# Patient Record
Sex: MALE | Race: WHITE | NOT HISPANIC OR LATINO
[De-identification: names, ages, dates, MRNs, and addresses within clinical notes are randomized per-mention and may not be internally consistent; named-entity substitution may affect disease eponyms.]

---

## 2017-04-03 ENCOUNTER — NON-APPOINTMENT (OUTPATIENT)
Age: 47
End: 2017-04-03

## 2017-04-03 ENCOUNTER — APPOINTMENT (OUTPATIENT)
Dept: CARDIOLOGY | Facility: CLINIC | Age: 47
End: 2017-04-03

## 2017-04-03 VITALS
TEMPERATURE: 98.3 F | DIASTOLIC BLOOD PRESSURE: 78 MMHG | HEIGHT: 78 IN | BODY MASS INDEX: 29.5 KG/M2 | WEIGHT: 255 LBS | HEART RATE: 63 BPM | OXYGEN SATURATION: 97 % | SYSTOLIC BLOOD PRESSURE: 122 MMHG

## 2017-04-03 DIAGNOSIS — I35.1 NONRHEUMATIC AORTIC (VALVE) INSUFFICIENCY: ICD-10-CM

## 2017-05-01 ENCOUNTER — NON-APPOINTMENT (OUTPATIENT)
Age: 47
End: 2017-05-01

## 2017-08-29 ENCOUNTER — FORM ENCOUNTER (OUTPATIENT)
Age: 47
End: 2017-08-29

## 2017-08-30 ENCOUNTER — OUTPATIENT (OUTPATIENT)
Dept: OUTPATIENT SERVICES | Facility: HOSPITAL | Age: 47
LOS: 1 days | End: 2017-08-30
Payer: COMMERCIAL

## 2017-08-30 ENCOUNTER — APPOINTMENT (OUTPATIENT)
Dept: MRI IMAGING | Facility: HOSPITAL | Age: 47
End: 2017-08-30

## 2017-08-30 DIAGNOSIS — R07.9 CHEST PAIN, UNSPECIFIED: ICD-10-CM

## 2017-08-30 DIAGNOSIS — Q23.1 CONGENITAL INSUFFICIENCY OF AORTIC VALVE: ICD-10-CM

## 2017-08-30 PROCEDURE — 75561 CARDIAC MRI FOR MORPH W/DYE: CPT

## 2017-08-30 PROCEDURE — 75561 CARDIAC MRI FOR MORPH W/DYE: CPT | Mod: 26

## 2017-09-07 ENCOUNTER — CLINICAL ADVICE (OUTPATIENT)
Age: 47
End: 2017-09-07

## 2017-09-14 ENCOUNTER — NON-APPOINTMENT (OUTPATIENT)
Age: 47
End: 2017-09-14

## 2017-09-14 ENCOUNTER — APPOINTMENT (OUTPATIENT)
Dept: CARDIOLOGY | Facility: CLINIC | Age: 47
End: 2017-09-14
Payer: COMMERCIAL

## 2017-09-14 VITALS
HEIGHT: 78 IN | OXYGEN SATURATION: 95 % | BODY MASS INDEX: 29.62 KG/M2 | DIASTOLIC BLOOD PRESSURE: 60 MMHG | WEIGHT: 256 LBS | SYSTOLIC BLOOD PRESSURE: 121 MMHG | TEMPERATURE: 98.1 F | HEART RATE: 67 BPM

## 2017-09-14 PROCEDURE — 99215 OFFICE O/P EST HI 40 MIN: CPT

## 2017-10-03 ENCOUNTER — APPOINTMENT (OUTPATIENT)
Dept: CV DIAGNOSITCS | Facility: HOSPITAL | Age: 47
End: 2017-10-03

## 2017-10-03 ENCOUNTER — OUTPATIENT (OUTPATIENT)
Dept: OUTPATIENT SERVICES | Facility: HOSPITAL | Age: 47
LOS: 1 days | End: 2017-10-03
Payer: COMMERCIAL

## 2017-10-03 DIAGNOSIS — I35.1 NONRHEUMATIC AORTIC (VALVE) INSUFFICIENCY: ICD-10-CM

## 2017-10-03 PROCEDURE — 93306 TTE W/DOPPLER COMPLETE: CPT | Mod: 26

## 2017-10-03 PROCEDURE — 93306 TTE W/DOPPLER COMPLETE: CPT

## 2017-10-03 PROCEDURE — 93312 ECHO TRANSESOPHAGEAL: CPT | Mod: 26

## 2017-10-03 PROCEDURE — 93312 ECHO TRANSESOPHAGEAL: CPT

## 2017-10-06 ENCOUNTER — APPOINTMENT (OUTPATIENT)
Dept: CARDIOLOGY | Facility: CLINIC | Age: 47
End: 2017-10-06
Payer: COMMERCIAL

## 2017-10-06 PROCEDURE — 93015 CV STRESS TEST SUPVJ I&R: CPT

## 2017-12-07 ENCOUNTER — TRANSCRIPTION ENCOUNTER (OUTPATIENT)
Age: 47
End: 2017-12-07

## 2017-12-14 ENCOUNTER — NON-APPOINTMENT (OUTPATIENT)
Age: 47
End: 2017-12-14

## 2017-12-14 ENCOUNTER — APPOINTMENT (OUTPATIENT)
Dept: CARDIOLOGY | Facility: CLINIC | Age: 47
End: 2017-12-14
Payer: COMMERCIAL

## 2017-12-14 VITALS
WEIGHT: 256 LBS | HEART RATE: 77 BPM | OXYGEN SATURATION: 96 % | DIASTOLIC BLOOD PRESSURE: 90 MMHG | SYSTOLIC BLOOD PRESSURE: 150 MMHG | BODY MASS INDEX: 29.58 KG/M2

## 2017-12-14 PROCEDURE — 93000 ELECTROCARDIOGRAM COMPLETE: CPT

## 2017-12-14 PROCEDURE — 99215 OFFICE O/P EST HI 40 MIN: CPT

## 2018-03-27 ENCOUNTER — NON-APPOINTMENT (OUTPATIENT)
Age: 48
End: 2018-03-27

## 2018-03-27 ENCOUNTER — APPOINTMENT (OUTPATIENT)
Dept: CARDIOLOGY | Facility: CLINIC | Age: 48
End: 2018-03-27
Payer: COMMERCIAL

## 2018-03-27 VITALS
HEART RATE: 59 BPM | TEMPERATURE: 98.7 F | SYSTOLIC BLOOD PRESSURE: 140 MMHG | DIASTOLIC BLOOD PRESSURE: 76 MMHG | WEIGHT: 256 LBS | OXYGEN SATURATION: 97 % | BODY MASS INDEX: 29.62 KG/M2 | HEIGHT: 78 IN

## 2018-03-27 PROCEDURE — 93000 ELECTROCARDIOGRAM COMPLETE: CPT

## 2018-03-27 PROCEDURE — 99214 OFFICE O/P EST MOD 30 MIN: CPT

## 2018-04-11 ENCOUNTER — APPOINTMENT (OUTPATIENT)
Dept: CARDIOLOGY | Facility: CLINIC | Age: 48
End: 2018-04-11
Payer: COMMERCIAL

## 2018-04-11 PROCEDURE — 93306 TTE W/DOPPLER COMPLETE: CPT

## 2018-05-14 ENCOUNTER — MEDICATION RENEWAL (OUTPATIENT)
Age: 48
End: 2018-05-14

## 2018-10-09 ENCOUNTER — APPOINTMENT (OUTPATIENT)
Dept: CARDIOLOGY | Facility: CLINIC | Age: 48
End: 2018-10-09
Payer: COMMERCIAL

## 2018-10-09 ENCOUNTER — NON-APPOINTMENT (OUTPATIENT)
Age: 48
End: 2018-10-09

## 2018-10-09 VITALS
WEIGHT: 260 LBS | BODY MASS INDEX: 30.08 KG/M2 | SYSTOLIC BLOOD PRESSURE: 120 MMHG | TEMPERATURE: 98.1 F | OXYGEN SATURATION: 95 % | HEART RATE: 66 BPM | DIASTOLIC BLOOD PRESSURE: 76 MMHG | HEIGHT: 78 IN

## 2018-10-09 PROCEDURE — 93000 ELECTROCARDIOGRAM COMPLETE: CPT

## 2018-10-09 PROCEDURE — 99214 OFFICE O/P EST MOD 30 MIN: CPT

## 2018-11-01 ENCOUNTER — TRANSCRIPTION ENCOUNTER (OUTPATIENT)
Age: 48
End: 2018-11-01

## 2018-11-08 ENCOUNTER — APPOINTMENT (OUTPATIENT)
Dept: CARDIOLOGY | Facility: CLINIC | Age: 48
End: 2018-11-08
Payer: COMMERCIAL

## 2018-11-08 PROCEDURE — 93351 STRESS TTE COMPLETE: CPT

## 2018-11-08 PROCEDURE — 93320 DOPPLER ECHO COMPLETE: CPT

## 2018-11-08 PROCEDURE — 93325 DOPPLER ECHO COLOR FLOW MAPG: CPT

## 2018-11-25 ENCOUNTER — TRANSCRIPTION ENCOUNTER (OUTPATIENT)
Age: 48
End: 2018-11-25

## 2019-03-04 ENCOUNTER — RX RENEWAL (OUTPATIENT)
Age: 49
End: 2019-03-04

## 2019-05-08 ENCOUNTER — NON-APPOINTMENT (OUTPATIENT)
Age: 49
End: 2019-05-08

## 2019-05-08 ENCOUNTER — APPOINTMENT (OUTPATIENT)
Dept: CARDIOLOGY | Facility: CLINIC | Age: 49
End: 2019-05-08
Payer: COMMERCIAL

## 2019-05-08 VITALS
WEIGHT: 264 LBS | DIASTOLIC BLOOD PRESSURE: 78 MMHG | HEART RATE: 65 BPM | BODY MASS INDEX: 30.55 KG/M2 | HEIGHT: 78 IN | OXYGEN SATURATION: 95 % | SYSTOLIC BLOOD PRESSURE: 120 MMHG

## 2019-05-08 PROCEDURE — 93000 ELECTROCARDIOGRAM COMPLETE: CPT

## 2019-05-08 PROCEDURE — 99214 OFFICE O/P EST MOD 30 MIN: CPT

## 2019-05-08 NOTE — HISTORY OF PRESENT ILLNESS
[FreeTextEntry1] : He continue s to feel well overall and he has been Doubles playing tennis without difficulty.\par His exercise capacity appears unchanged on his most recent exercise test in 2018\par He denies any palpitations lightheadedness dizziness or syncope.\par No exertional dyspnea.\par No changes to his medications have been made.\par

## 2019-05-08 NOTE — PHYSICAL EXAM
[General Appearance - Well Developed] : well developed [General Appearance - Well Nourished] : well nourished [General Appearance - In No Acute Distress] : no acute distress [Normal Conjunctiva] : the conjunctiva exhibited no abnormalities [Normal Oropharynx] : normal oropharynx [Respiration, Rhythm And Depth] : normal respiratory rhythm and effort [Normal Jugular Venous V Waves Present] : normal jugular venous V waves present [Heart Sounds] : normal S1 and S2 [Arterial Pulses Normal] : the arterial pulses were normal [Edema] : no peripheral edema present [Regular] : the rhythm was regular [FreeTextEntry1] : a 1/4 holodiastolic murmur is heard at the right Lower sternal border and aex. [Bowel Sounds] : normal bowel sounds [Abdomen Soft] : soft [Abnormal Walk] : normal gait [Cyanosis, Localized] : no localized cyanosis [Skin Color & Pigmentation] : normal skin color and pigmentation [Skin Turgor] : normal skin turgor [Oriented To Time, Place, And Person] : oriented to person, place, and time [Impaired Insight] : insight and judgment were intact [Affect] : the affect was normal

## 2019-05-08 NOTE — DISCUSSION/SUMMARY
[FreeTextEntry1] : Plan for repeat echocardiogram to monitor his left ventricular size for significant dilatation it would trigger surgical evaluation.Mr. Witt is a 48-year-old with bicuspid aortic valve and severe aortic insufficiency on echo a year a-ago.\par He is asymptomatic, but severe, aortic insufficiency.\par No significant aortopathy is seen on MRI.\par I reviewed some of the indications for aortic valve replacement again as well as repair at [facility].\par His blood pressure is improved on his Current dose of atenolol, b.i.d.\par Echo in Nov =/- MRI next time.\par

## 2019-11-12 ENCOUNTER — APPOINTMENT (OUTPATIENT)
Dept: CARDIOLOGY | Facility: CLINIC | Age: 49
End: 2019-11-12
Payer: COMMERCIAL

## 2019-11-12 PROCEDURE — 93306 TTE W/DOPPLER COMPLETE: CPT

## 2019-11-27 ENCOUNTER — RX RENEWAL (OUTPATIENT)
Age: 49
End: 2019-11-27

## 2020-02-27 ENCOUNTER — RX RENEWAL (OUTPATIENT)
Age: 50
End: 2020-02-27

## 2020-05-05 ENCOUNTER — APPOINTMENT (OUTPATIENT)
Dept: CARDIOLOGY | Facility: CLINIC | Age: 50
End: 2020-05-05
Payer: COMMERCIAL

## 2020-05-05 ENCOUNTER — NON-APPOINTMENT (OUTPATIENT)
Age: 50
End: 2020-05-05

## 2020-05-05 VITALS
HEIGHT: 78 IN | WEIGHT: 262 LBS | HEART RATE: 64 BPM | SYSTOLIC BLOOD PRESSURE: 124 MMHG | DIASTOLIC BLOOD PRESSURE: 80 MMHG | BODY MASS INDEX: 30.31 KG/M2 | OXYGEN SATURATION: 98 %

## 2020-05-05 PROCEDURE — 99214 OFFICE O/P EST MOD 30 MIN: CPT

## 2020-05-05 PROCEDURE — 93000 ELECTROCARDIOGRAM COMPLETE: CPT

## 2020-05-05 NOTE — PHYSICAL EXAM
[General Appearance - Well Nourished] : well nourished [General Appearance - Well Developed] : well developed [General Appearance - In No Acute Distress] : no acute distress [Normal Conjunctiva] : the conjunctiva exhibited no abnormalities [Normal Oropharynx] : normal oropharynx [Normal Jugular Venous V Waves Present] : normal jugular venous V waves present [Respiration, Rhythm And Depth] : normal respiratory rhythm and effort [Heart Sounds] : normal S1 and S2 [Arterial Pulses Normal] : the arterial pulses were normal [Edema] : no peripheral edema present [FreeTextEntry1] : a 1/4 holodiastolic murmur is heard at the right Lower sternal border and aex. [Regular] : the rhythm was regular [Bowel Sounds] : normal bowel sounds [Abdomen Soft] : soft [Abnormal Walk] : normal gait [Skin Color & Pigmentation] : normal skin color and pigmentation [Cyanosis, Localized] : no localized cyanosis [Skin Turgor] : normal skin turgor [Oriented To Time, Place, And Person] : oriented to person, place, and time [Impaired Insight] : insight and judgment were intact [Affect] : the affect was normal

## 2020-05-05 NOTE — HISTORY OF PRESENT ILLNESS
[FreeTextEntry1] : He is feeling well.\par Still walking the dog without difficulty for 2 miles.\par  \par His exercise capacity appears unchanged on his most recent exercise test in 2018\par He denies any palpitations lightheadedness dizziness or syncope.\par No exertional dyspnea.\par No changes to his medications have been made.\par

## 2020-05-05 NOTE — DISCUSSION/SUMMARY
[FreeTextEntry1] :  Mr. Witt is a 49-year-old with bicuspid aortic valve and severe aortic insufficiency on echo with good exercise capacity. \par He is asymptomatic, but severe, aortic insufficiency.\par No significant aortopathy is seen on MRI 8/2017.\par His blood pressure is improved on his Current dose of atenolol, b.i.d.\par  MRI next time.\par stress if MRI shows increasing LV

## 2020-05-27 ENCOUNTER — RX RENEWAL (OUTPATIENT)
Age: 50
End: 2020-05-27

## 2020-06-23 ENCOUNTER — TRANSCRIPTION ENCOUNTER (OUTPATIENT)
Age: 50
End: 2020-06-23

## 2020-08-23 ENCOUNTER — RX RENEWAL (OUTPATIENT)
Age: 50
End: 2020-08-23

## 2020-12-31 ENCOUNTER — NON-APPOINTMENT (OUTPATIENT)
Age: 50
End: 2020-12-31

## 2020-12-31 ENCOUNTER — APPOINTMENT (OUTPATIENT)
Dept: CARDIOLOGY | Facility: CLINIC | Age: 50
End: 2020-12-31
Payer: COMMERCIAL

## 2020-12-31 VITALS
TEMPERATURE: 97.6 F | WEIGHT: 265 LBS | BODY MASS INDEX: 30.66 KG/M2 | OXYGEN SATURATION: 97 % | DIASTOLIC BLOOD PRESSURE: 60 MMHG | SYSTOLIC BLOOD PRESSURE: 126 MMHG | HEIGHT: 78 IN | HEART RATE: 60 BPM

## 2020-12-31 VITALS
HEIGHT: 78 IN | BODY MASS INDEX: 30.78 KG/M2 | DIASTOLIC BLOOD PRESSURE: 64 MMHG | SYSTOLIC BLOOD PRESSURE: 140 MMHG | WEIGHT: 266 LBS

## 2020-12-31 PROCEDURE — 93000 ELECTROCARDIOGRAM COMPLETE: CPT

## 2020-12-31 PROCEDURE — 93306 TTE W/DOPPLER COMPLETE: CPT

## 2020-12-31 PROCEDURE — 99214 OFFICE O/P EST MOD 30 MIN: CPT

## 2020-12-31 PROCEDURE — 99072 ADDL SUPL MATRL&STAF TM PHE: CPT

## 2020-12-31 NOTE — DISCUSSION/SUMMARY
[FreeTextEntry1] :  Mr. Witt is a 50-year-old with bicuspid aortic valve and severe aortic insufficiency on echo with good exercise capacity. \par He is asymptomatic, but severe, aortic insufficiency.\par No significant aortopathy is seen on MRI 8/2017.\par LV size is slightly more enlarged, but not reaching the size to indicate surgery.\par His blood pressure is excellent on his Current dose of atenolol, b.i.d.\par Consider exercise or MRI next time.\par

## 2020-12-31 NOTE — HISTORY OF PRESENT ILLNESS
[FreeTextEntry1] : He is feeling well.\par Still walking the dog without difficulty.\par No exertional symptoms.\par \par \par Prior: \par His exercise capacity appears unchanged on his most recent exercise test in 2018\par He denies any palpitations lightheadedness dizziness or syncope.\par No exertional dyspnea.\par No changes to his medications have been made.\par

## 2020-12-31 NOTE — PHYSICAL EXAM
[General Appearance - Well Developed] : well developed [General Appearance - Well Nourished] : well nourished [General Appearance - In No Acute Distress] : no acute distress [Normal Conjunctiva] : the conjunctiva exhibited no abnormalities [Normal Oropharynx] : normal oropharynx [Normal Jugular Venous V Waves Present] : normal jugular venous V waves present [Respiration, Rhythm And Depth] : normal respiratory rhythm and effort [Heart Sounds] : normal S1 and S2 [Arterial Pulses Normal] : the arterial pulses were normal [Edema] : no peripheral edema present [Regular] : the rhythm was regular [FreeTextEntry1] : a 1/4 holodiastolic murmur is heard at the right Lower sternal border and aex. [Bowel Sounds] : normal bowel sounds [Abdomen Soft] : soft [Abnormal Walk] : normal gait [Cyanosis, Localized] : no localized cyanosis [Skin Color & Pigmentation] : normal skin color and pigmentation [Skin Turgor] : normal skin turgor [Oriented To Time, Place, And Person] : oriented to person, place, and time [Impaired Insight] : insight and judgment were intact [Affect] : the affect was normal

## 2021-02-18 ENCOUNTER — RX RENEWAL (OUTPATIENT)
Age: 51
End: 2021-02-18

## 2021-05-21 ENCOUNTER — RX RENEWAL (OUTPATIENT)
Age: 51
End: 2021-05-21

## 2021-06-30 ENCOUNTER — NON-APPOINTMENT (OUTPATIENT)
Age: 51
End: 2021-06-30

## 2021-06-30 ENCOUNTER — APPOINTMENT (OUTPATIENT)
Dept: CARDIOLOGY | Facility: CLINIC | Age: 51
End: 2021-06-30
Payer: COMMERCIAL

## 2021-06-30 VITALS
WEIGHT: 246 LBS | BODY MASS INDEX: 28.46 KG/M2 | SYSTOLIC BLOOD PRESSURE: 126 MMHG | HEART RATE: 65 BPM | OXYGEN SATURATION: 97 % | RESPIRATION RATE: 17 BRPM | TEMPERATURE: 98.1 F | DIASTOLIC BLOOD PRESSURE: 70 MMHG | HEIGHT: 78 IN

## 2021-06-30 PROCEDURE — 99072 ADDL SUPL MATRL&STAF TM PHE: CPT

## 2021-06-30 PROCEDURE — 93000 ELECTROCARDIOGRAM COMPLETE: CPT

## 2021-06-30 PROCEDURE — 99214 OFFICE O/P EST MOD 30 MIN: CPT

## 2021-06-30 NOTE — HISTORY OF PRESENT ILLNESS
[FreeTextEntry1] : He is feeling well.\par Still walking the dog without difficulty.\par No exertional symptoms.\par He is able to play doubles tennis without difficulty.\par He denies any lightheadedness, dizziness or syncope.\par \par Prior: \par His exercise capacity appears unchanged on his most recent exercise test in 2018\par He denies any palpitations lightheadedness dizziness or syncope.\par No exertional dyspnea.\par No changes to his medications have been made.\par

## 2021-06-30 NOTE — PHYSICAL EXAM
[General Appearance - Well Developed] : well developed [General Appearance - Well Nourished] : well nourished [General Appearance - In No Acute Distress] : no acute distress [Normal Conjunctiva] : the conjunctiva exhibited no abnormalities [Normal Oropharynx] : normal oropharynx [Normal Jugular Venous V Waves Present] : normal jugular venous V waves present [Respiration, Rhythm And Depth] : normal respiratory rhythm and effort [Heart Sounds] : normal S1 and S2 [Arterial Pulses Normal] : the arterial pulses were normal [Edema] : no peripheral edema present [Regular] : the rhythm was regular [FreeTextEntry1] : a 2/4 blowing holodiastolic murmur is heard at the right Lower sternal border and aex. [Bowel Sounds] : normal bowel sounds [Abdomen Soft] : soft [Abnormal Walk] : normal gait [Cyanosis, Localized] : no localized cyanosis [Skin Color & Pigmentation] : normal skin color and pigmentation [Skin Turgor] : normal skin turgor [Oriented To Time, Place, And Person] : oriented to person, place, and time [Impaired Insight] : insight and judgment were intact [Affect] : the affect was normal

## 2021-06-30 NOTE — DISCUSSION/SUMMARY
[FreeTextEntry1] :  Mr. Witt is a 50-year-old with bicuspid aortic valve and severe aortic insufficiency on echo with good exercise capacity. \par He appears asymptomatic, however I have scheduled him for a stress echocardiogram in order to quantify his exercise capacity and compare with the one that we did back in 2018.\par Repeat MRI will exclude aortic aortopathy.  This should be compared with the MRI done in August 2017.\par His blood pressure control is excellent on his current dose of atenolol and no changes are suggested.\par MRI over the summer, stress echo in December.

## 2021-08-26 ENCOUNTER — APPOINTMENT (OUTPATIENT)
Dept: MRI IMAGING | Facility: CLINIC | Age: 51
End: 2021-08-26
Payer: COMMERCIAL

## 2021-08-26 ENCOUNTER — OUTPATIENT (OUTPATIENT)
Dept: OUTPATIENT SERVICES | Facility: HOSPITAL | Age: 51
LOS: 1 days | End: 2021-08-26
Payer: COMMERCIAL

## 2021-08-26 ENCOUNTER — RESULT REVIEW (OUTPATIENT)
Age: 51
End: 2021-08-26

## 2021-08-26 DIAGNOSIS — I35.1 NONRHEUMATIC AORTIC (VALVE) INSUFFICIENCY: ICD-10-CM

## 2021-08-26 PROCEDURE — A9585: CPT

## 2021-08-26 PROCEDURE — 75561 CARDIAC MRI FOR MORPH W/DYE: CPT

## 2021-08-26 PROCEDURE — 75565 CARD MRI VELOC FLOW MAPPING: CPT | Mod: 26

## 2021-08-26 PROCEDURE — 75561 CARDIAC MRI FOR MORPH W/DYE: CPT | Mod: 26

## 2021-08-26 PROCEDURE — 75565 CARD MRI VELOC FLOW MAPPING: CPT

## 2021-08-29 ENCOUNTER — RX RENEWAL (OUTPATIENT)
Age: 51
End: 2021-08-29

## 2021-09-02 DIAGNOSIS — R91.1 SOLITARY PULMONARY NODULE: ICD-10-CM

## 2021-12-06 ENCOUNTER — APPOINTMENT (OUTPATIENT)
Dept: CARDIOLOGY | Facility: CLINIC | Age: 51
End: 2021-12-06
Payer: COMMERCIAL

## 2021-12-06 PROCEDURE — 93351 STRESS TTE COMPLETE: CPT

## 2021-12-06 PROCEDURE — 93320 DOPPLER ECHO COMPLETE: CPT

## 2021-12-06 PROCEDURE — 93325 DOPPLER ECHO COLOR FLOW MAPG: CPT

## 2022-06-16 ENCOUNTER — APPOINTMENT (OUTPATIENT)
Dept: CARDIOLOGY | Facility: CLINIC | Age: 52
End: 2022-06-16
Payer: COMMERCIAL

## 2022-06-16 ENCOUNTER — NON-APPOINTMENT (OUTPATIENT)
Age: 52
End: 2022-06-16

## 2022-06-16 VITALS
HEART RATE: 65 BPM | HEIGHT: 78 IN | WEIGHT: 246 LBS | OXYGEN SATURATION: 97 % | SYSTOLIC BLOOD PRESSURE: 122 MMHG | RESPIRATION RATE: 17 BRPM | DIASTOLIC BLOOD PRESSURE: 74 MMHG | BODY MASS INDEX: 28.46 KG/M2

## 2022-06-16 PROCEDURE — 93000 ELECTROCARDIOGRAM COMPLETE: CPT

## 2022-06-16 PROCEDURE — 99214 OFFICE O/P EST MOD 30 MIN: CPT

## 2022-06-16 NOTE — PHYSICAL EXAM
[General Appearance - Well Developed] : well developed [General Appearance - Well Nourished] : well nourished [General Appearance - In No Acute Distress] : no acute distress [Normal Conjunctiva] : the conjunctiva exhibited no abnormalities [Normal Oropharynx] : normal oropharynx [Normal Jugular Venous V Waves Present] : normal jugular venous V waves present [Respiration, Rhythm And Depth] : normal respiratory rhythm and effort [Heart Sounds] : normal S1 and S2 [Arterial Pulses Normal] : the arterial pulses were normal [Regular] : the rhythm was regular [Edema] : no peripheral edema present [FreeTextEntry1] : a 2/4 blowing holodiastolic murmur is heard at the right Lower sternal border and aex. [Bowel Sounds] : normal bowel sounds [Abdomen Soft] : soft [Abnormal Walk] : normal gait [Cyanosis, Localized] : no localized cyanosis [Skin Color & Pigmentation] : normal skin color and pigmentation [Skin Turgor] : normal skin turgor [Oriented To Time, Place, And Person] : oriented to person, place, and time [Impaired Insight] : insight and judgment were intact [Affect] : the affect was normal

## 2022-06-16 NOTE — HISTORY OF PRESENT ILLNESS
[FreeTextEntry1] : Doubles tennis 1.5 hours weekly.\par No chest pain or dyspnea.\par Labs with Dr. Rosa. all good.\par Walking without difficulty.\par \par Prior:\par He is feeling well.\par Still walking the dog without difficulty.\par No exertional symptoms.\par He is able to play doubles tennis without difficulty.\par He denies any lightheadedness, dizziness or syncope.\par \par Prior: \par His exercise capacity appears unchanged on his most recent exercise test in 2018\par He denies any palpitations lightheadedness dizziness or syncope.\par No exertional dyspnea.\par No changes to his medications have been made.\par

## 2022-06-16 NOTE — DISCUSSION/SUMMARY
[FreeTextEntry1] :  Mr. Witt is a 51-year-old with bicuspid aortic valve and moderate to severe aortic insufficiency on echo with good exercise capacity. \par He appears asymptomatic, however I have scheduled him for an echocardiogram in order to quantify his AI.\par MRI next year again.\par

## 2022-07-21 ENCOUNTER — APPOINTMENT (OUTPATIENT)
Dept: CARDIOLOGY | Facility: CLINIC | Age: 52
End: 2022-07-21

## 2022-07-21 PROCEDURE — 93306 TTE W/DOPPLER COMPLETE: CPT

## 2022-08-01 ENCOUNTER — TRANSCRIPTION ENCOUNTER (OUTPATIENT)
Age: 52
End: 2022-08-01

## 2023-01-17 ENCOUNTER — APPOINTMENT (OUTPATIENT)
Dept: CARDIOLOGY | Facility: CLINIC | Age: 53
End: 2023-01-17
Payer: COMMERCIAL

## 2023-01-17 ENCOUNTER — NON-APPOINTMENT (OUTPATIENT)
Age: 53
End: 2023-01-17

## 2023-01-17 VITALS
SYSTOLIC BLOOD PRESSURE: 124 MMHG | DIASTOLIC BLOOD PRESSURE: 66 MMHG | OXYGEN SATURATION: 98 % | HEIGHT: 78 IN | HEART RATE: 65 BPM | RESPIRATION RATE: 17 BRPM | WEIGHT: 250 LBS | BODY MASS INDEX: 28.93 KG/M2

## 2023-01-17 PROCEDURE — 99214 OFFICE O/P EST MOD 30 MIN: CPT | Mod: 25

## 2023-01-17 PROCEDURE — 93000 ELECTROCARDIOGRAM COMPLETE: CPT

## 2023-01-17 NOTE — DISCUSSION/SUMMARY
[FreeTextEntry1] :  Mr. Witt is a 52-year-old with bicuspid aortic valve and moderate to severe aortic insufficiency on echo with good exercise capacity. \par He appears asymptomatic \par MRI this year. \par Referred to Dr. Silva for evaluation. No clear indication for surgery thus far from a symptom standpoint.\par See me for echo in the summer. [EKG obtained to assist in diagnosis and management of assessed problem(s)] : EKG obtained to assist in diagnosis and management of assessed problem(s)

## 2023-01-17 NOTE — HISTORY OF PRESENT ILLNESS
[FreeTextEntry1] : Continues to exercise by playing tennis and walking uphill.\par No palpitations or dyspnea.\par No syncope.\par \par Prior:\par Doubles tennis 1.5 hours weekly.\par No chest pain or dyspnea.\par Labs with Dr. Rosa. all good.\par Walking without difficulty.\par \par Prior:\par He is feeling well.\par Still walking the dog without difficulty.\par No exertional symptoms.\par He is able to play doubles tennis without difficulty.\par He denies any lightheadedness, dizziness or syncope.\par \par Prior: \par His exercise capacity appears unchanged on his most recent exercise test in 2018\par He denies any palpitations lightheadedness dizziness or syncope.\par No exertional dyspnea.\par No changes to his medications have been made.\par

## 2023-01-30 ENCOUNTER — APPOINTMENT (OUTPATIENT)
Dept: MRI IMAGING | Facility: CLINIC | Age: 53
End: 2023-01-30
Payer: COMMERCIAL

## 2023-01-30 ENCOUNTER — OUTPATIENT (OUTPATIENT)
Dept: OUTPATIENT SERVICES | Facility: HOSPITAL | Age: 53
LOS: 1 days | End: 2023-01-30
Payer: COMMERCIAL

## 2023-01-30 DIAGNOSIS — I35.1 NONRHEUMATIC AORTIC (VALVE) INSUFFICIENCY: ICD-10-CM

## 2023-01-30 PROCEDURE — A9585: CPT

## 2023-01-30 PROCEDURE — C8911: CPT

## 2023-01-30 PROCEDURE — 71555 MRI ANGIO CHEST W OR W/O DYE: CPT | Mod: 26

## 2023-01-31 ENCOUNTER — FORM ENCOUNTER (OUTPATIENT)
Age: 53
End: 2023-01-31

## 2023-02-01 ENCOUNTER — RESULT REVIEW (OUTPATIENT)
Age: 53
End: 2023-02-01

## 2023-02-01 ENCOUNTER — NON-APPOINTMENT (OUTPATIENT)
Age: 53
End: 2023-02-01

## 2023-02-01 ENCOUNTER — APPOINTMENT (OUTPATIENT)
Dept: CARDIOTHORACIC SURGERY | Facility: CLINIC | Age: 53
End: 2023-02-01
Payer: COMMERCIAL

## 2023-02-01 ENCOUNTER — OUTPATIENT (OUTPATIENT)
Dept: OUTPATIENT SERVICES | Facility: HOSPITAL | Age: 53
LOS: 1 days | End: 2023-02-01
Payer: COMMERCIAL

## 2023-02-01 VITALS
TEMPERATURE: 97.9 F | HEART RATE: 67 BPM | HEIGHT: 78 IN | RESPIRATION RATE: 17 BRPM | BODY MASS INDEX: 28.23 KG/M2 | SYSTOLIC BLOOD PRESSURE: 152 MMHG | WEIGHT: 244 LBS | OXYGEN SATURATION: 96 % | DIASTOLIC BLOOD PRESSURE: 67 MMHG

## 2023-02-01 DIAGNOSIS — I35.1 NONRHEUMATIC AORTIC (VALVE) INSUFFICIENCY: ICD-10-CM

## 2023-02-01 DIAGNOSIS — R91.1 SOLITARY PULMONARY NODULE: ICD-10-CM

## 2023-02-01 DIAGNOSIS — Q23.1 CONGENITAL INSUFFICIENCY OF AORTIC VALVE: ICD-10-CM

## 2023-02-01 DIAGNOSIS — I50.9 HEART FAILURE, UNSPECIFIED: ICD-10-CM

## 2023-02-01 PROCEDURE — 93306 TTE W/DOPPLER COMPLETE: CPT | Mod: 26

## 2023-02-01 PROCEDURE — 93306 TTE W/DOPPLER COMPLETE: CPT

## 2023-02-01 PROCEDURE — 99204 OFFICE O/P NEW MOD 45 MIN: CPT

## 2023-02-02 PROBLEM — I35.1 SEVERE AORTIC REGURGITATION: Status: ACTIVE | Noted: 2017-09-14

## 2023-02-02 PROBLEM — I50.9 CHF NYHA CLASS I: Status: ACTIVE | Noted: 2023-02-02

## 2023-02-06 NOTE — HISTORY OF PRESENT ILLNESS
[FreeTextEntry1] : 52 year old male with a past medical hx of lung nodule, presents for an initial evaluation and management of bicuspid aortic valve, severe aortic regurgitation and dilated aortic root. \par \par TTE 7/21/22\par EF 56%. aortic valve appears  tricuspid with possible aortic valve raphe. peak transaortic valve gradient 21mmHg with a mean of 12mmHg. severe aortic regurgitation. \par aortic root of 4.4cm. \par ascending aorta 3.9cm. \par LVIDd 5.9cm. \par \par MRA chest 1/20/23\par 1.  Aortic root measures 3.5 cm, increased since 2017 when it measured 3.2 cm. Sinotubular junction measures 3.2 cm, previously 3 cm 2017.\par 2.  Left lower lobe nodule measuring 1.1 cm, increased compared to 2017. A noncontrast chest CT scan is recommended to further evaluate.\par \par TTE today: \par  1. Normal left ventricular size and systolic function.\par  2. Normal right ventricular size and systolic function.\par  3. Normal atria.\par  4. Aortic valve is not well visualized, but is probably bicuspid.\par  5. Severe aortic regurgitation. The pressure half time of aortic regurgitation is 684.00 ms.\par  6. Systolic anterior motion of the mitral valve is seen without evidence of LVOT obstruction.\par  7. No evidence of pulmonary hypertension.\par  8. No pericardial effusion.\par  9. No prior echo is available for comparison.\par \par Patient reports feeling winded compared to last year. He is able to play tennis and ambulate without chest pain or shortness of breath.

## 2023-02-06 NOTE — ASSESSMENT
[FreeTextEntry1] : 52 year old male with a past medical hx of lung nodule, presents for an initial evaluation and management of bicuspid aortic valve, severe aortic regurgitation and dilated aortic root. \par \par TTE today: \par  1. Normal left ventricular size and systolic function.\par  2. Normal right ventricular size and systolic function.\par  3. Normal atria.\par  4. Aortic valve is not well visualized, but is probably bicuspid.\par  5. Severe aortic regurgitation. The pressure half time of aortic regurgitation is 684.00 ms.\par  6. Systolic anterior motion of the mitral valve is seen without evidence of LVOT obstruction.\par  7. No evidence of pulmonary hypertension.\par  8. No pericardial effusion.\par  9. No prior echo is available for comparison.\par \par Plan: \par I have discussed the indications for surgery which include: decrease ejection fraction and worsening aortic stenosis on echocardiogram, signs and symptoms of congestive heart failure, and other necessary cardiac procedures such as coronary artery bypass grafting.\par \par I had a lengthy discussion with the patient regarding his valvular disease and progression. I have recommended that the patient is a candidate for a minimally invasive aortic valve replacement. \par \par The patient was educated on various valve options - mechanical valve prosthesis vs. a biological valve. In general, approximately 50% of these need to be removed at approximately 15 years. However, these valves do not require Coumadin therapy and, therefore, do not have the associated risks of the blood thinner. I discussed that with the current use of Transcatheter Aortic Valve Replacement (TAVR), there is a possibility that future replacement of a failing bioprosthetic valve by TAVR may be an option. The Inspira and MagnaEase valves and their morphology fitted for future TAVRs was discussed as well. \par \par I have discussed the risks, benefits and alternatives to surgery. I have explained the risks of the surgery, including approximately 1% major mortality or morbidity including stroke, infection, bleeding, death, renal failure and heart attack. \par \par -the patient is a candidate for an aortic valve replacement, sharla-sternotomy. DOS pending. \par -will discuss with Dr. Lisker re: surgery and contact the pt next week to follow up.\par -continue current medication regimen. \par -BP management.

## 2023-02-06 NOTE — PHYSICAL EXAM
[General Appearance - Alert] : alert [General Appearance - In No Acute Distress] : in no acute distress [Sclera] : the sclera and conjunctiva were normal [Outer Ear] : the ears and nose were normal in appearance [Neck Appearance] : the appearance of the neck was normal [] : no respiratory distress [Respiration, Rhythm And Depth] : normal respiratory rhythm and effort [Exaggerated Use Of Accessory Muscles For Inspiration] : no accessory muscle use [Auscultation Breath Sounds / Voice Sounds] : lungs were clear to auscultation bilaterally [Normal Rate] : normal [Rhythm Regular] : regular [II] : a grade 2 [No Pitting Edema] : no pitting edema present [Examination Of The Chest] : the chest was normal in appearance [2+] : left 2+ [Bowel Sounds] : normal bowel sounds [Abdomen Soft] : soft [No CVA Tenderness] : no ~M costovertebral angle tenderness [Abnormal Walk] : normal gait [Skin Color & Pigmentation] : normal skin color and pigmentation [No Focal Deficits] : no focal deficits [Oriented To Time, Place, And Person] : oriented to person, place, and time [FreeTextEntry1] : deferred

## 2023-02-06 NOTE — END OF VISIT
[Time Spent: ___ minutes] : I have spent [unfilled] minutes of time on the encounter. [FreeTextEntry3] : \par I, MAHESH PEREZU , am scribing for and in the presence of EDMUNDO CAO the following sections: History of present illness, past Medical/family/surgical/family/social history, review of systems, vital signs, physical exam and disposition.\par \par I personally performed the services described in the documentation, reviewed the documentation recorded by the scribe in my presence and it accurately and completely records my words and actions.\par \par

## 2023-02-06 NOTE — DATA REVIEWED
[FreeTextEntry1] : Cardiac MRI: 2/24/15, no aortic coarctation, bicuspid aortic valve, aortic insufficiency, with calculated regurgitant fraction of 35%. Aortic root is measured at the sinuses of Valsalva, is 3.9 cm. The left ventricle is somewhat dilated.

## 2023-02-06 NOTE — PROCEDURE
[FreeTextEntry1] : Patient was advised to view the educational video prior to this visit regarding aortic pathology, risk factors, surgical procedures, and lifestyle modifications. Video can be retrieved at [email or web address].\par

## 2023-02-09 ENCOUNTER — NON-APPOINTMENT (OUTPATIENT)
Age: 53
End: 2023-02-09

## 2023-02-17 ENCOUNTER — APPOINTMENT (OUTPATIENT)
Dept: CV DIAGNOSITCS | Facility: HOSPITAL | Age: 53
End: 2023-02-17

## 2023-02-18 ENCOUNTER — OUTPATIENT (OUTPATIENT)
Dept: OUTPATIENT SERVICES | Facility: HOSPITAL | Age: 53
LOS: 1 days | End: 2023-02-18
Payer: COMMERCIAL

## 2023-02-18 DIAGNOSIS — Z11.52 ENCOUNTER FOR SCREENING FOR COVID-19: ICD-10-CM

## 2023-02-18 LAB — SARS-COV-2 RNA SPEC QL NAA+PROBE: SIGNIFICANT CHANGE UP

## 2023-02-18 PROCEDURE — U0005: CPT

## 2023-02-18 PROCEDURE — C9803: CPT

## 2023-02-18 PROCEDURE — U0003: CPT

## 2023-02-21 ENCOUNTER — TRANSCRIPTION ENCOUNTER (OUTPATIENT)
Age: 53
End: 2023-02-21

## 2023-02-21 ENCOUNTER — OUTPATIENT (OUTPATIENT)
Dept: OUTPATIENT SERVICES | Facility: HOSPITAL | Age: 53
LOS: 1 days | End: 2023-02-21
Payer: COMMERCIAL

## 2023-02-21 VITALS
RESPIRATION RATE: 15 BRPM | HEART RATE: 61 BPM | OXYGEN SATURATION: 95 % | DIASTOLIC BLOOD PRESSURE: 58 MMHG | SYSTOLIC BLOOD PRESSURE: 114 MMHG

## 2023-02-21 VITALS
DIASTOLIC BLOOD PRESSURE: 64 MMHG | OXYGEN SATURATION: 98 % | RESPIRATION RATE: 20 BRPM | SYSTOLIC BLOOD PRESSURE: 141 MMHG | WEIGHT: 244.93 LBS | HEIGHT: 78 IN | HEART RATE: 60 BPM | TEMPERATURE: 98 F

## 2023-02-21 DIAGNOSIS — I35.1 NONRHEUMATIC AORTIC (VALVE) INSUFFICIENCY: ICD-10-CM

## 2023-02-21 LAB
ALBUMIN SERPL ELPH-MCNC: 5.1 G/DL — HIGH (ref 3.3–5)
ALP SERPL-CCNC: 63 U/L — SIGNIFICANT CHANGE UP (ref 40–120)
ALT FLD-CCNC: 28 U/L — SIGNIFICANT CHANGE UP (ref 10–45)
ANION GAP SERPL CALC-SCNC: 16 MMOL/L — SIGNIFICANT CHANGE UP (ref 5–17)
AST SERPL-CCNC: 28 U/L — SIGNIFICANT CHANGE UP (ref 10–40)
BILIRUB SERPL-MCNC: 0.6 MG/DL — SIGNIFICANT CHANGE UP (ref 0.2–1.2)
BUN SERPL-MCNC: 22 MG/DL — SIGNIFICANT CHANGE UP (ref 7–23)
CALCIUM SERPL-MCNC: 9.7 MG/DL — SIGNIFICANT CHANGE UP (ref 8.4–10.5)
CHLORIDE SERPL-SCNC: 104 MMOL/L — SIGNIFICANT CHANGE UP (ref 96–108)
CO2 SERPL-SCNC: 22 MMOL/L — SIGNIFICANT CHANGE UP (ref 22–31)
CREAT SERPL-MCNC: 0.91 MG/DL — SIGNIFICANT CHANGE UP (ref 0.5–1.3)
EGFR: 101 ML/MIN/1.73M2 — SIGNIFICANT CHANGE UP
GLUCOSE SERPL-MCNC: 106 MG/DL — HIGH (ref 70–99)
HCT VFR BLD CALC: 46 % — SIGNIFICANT CHANGE UP (ref 39–50)
HGB BLD-MCNC: 16.1 G/DL — SIGNIFICANT CHANGE UP (ref 13–17)
MCHC RBC-ENTMCNC: 30.6 PG — SIGNIFICANT CHANGE UP (ref 27–34)
MCHC RBC-ENTMCNC: 35 GM/DL — SIGNIFICANT CHANGE UP (ref 32–36)
MCV RBC AUTO: 87.3 FL — SIGNIFICANT CHANGE UP (ref 80–100)
NRBC # BLD: 0 /100 WBCS — SIGNIFICANT CHANGE UP (ref 0–0)
PLATELET # BLD AUTO: 154 K/UL — SIGNIFICANT CHANGE UP (ref 150–400)
POTASSIUM SERPL-MCNC: 4.4 MMOL/L — SIGNIFICANT CHANGE UP (ref 3.5–5.3)
POTASSIUM SERPL-SCNC: 4.4 MMOL/L — SIGNIFICANT CHANGE UP (ref 3.5–5.3)
PROT SERPL-MCNC: 7.6 G/DL — SIGNIFICANT CHANGE UP (ref 6–8.3)
RBC # BLD: 5.27 M/UL — SIGNIFICANT CHANGE UP (ref 4.2–5.8)
RBC # FLD: 12 % — SIGNIFICANT CHANGE UP (ref 10.3–14.5)
SODIUM SERPL-SCNC: 142 MMOL/L — SIGNIFICANT CHANGE UP (ref 135–145)
WBC # BLD: 5.55 K/UL — SIGNIFICANT CHANGE UP (ref 3.8–10.5)
WBC # FLD AUTO: 5.55 K/UL — SIGNIFICANT CHANGE UP (ref 3.8–10.5)

## 2023-02-21 PROCEDURE — C1894: CPT

## 2023-02-21 PROCEDURE — 85027 COMPLETE CBC AUTOMATED: CPT

## 2023-02-21 PROCEDURE — C1769: CPT

## 2023-02-21 PROCEDURE — 93005 ELECTROCARDIOGRAM TRACING: CPT

## 2023-02-21 PROCEDURE — 99152 MOD SED SAME PHYS/QHP 5/>YRS: CPT

## 2023-02-21 PROCEDURE — 80053 COMPREHEN METABOLIC PANEL: CPT

## 2023-02-21 PROCEDURE — C1887: CPT

## 2023-02-21 PROCEDURE — 93458 L HRT ARTERY/VENTRICLE ANGIO: CPT

## 2023-02-21 PROCEDURE — 93010 ELECTROCARDIOGRAM REPORT: CPT

## 2023-02-21 PROCEDURE — 93458 L HRT ARTERY/VENTRICLE ANGIO: CPT | Mod: 26

## 2023-02-21 NOTE — ASU DISCHARGE PLAN (ADULT/PEDIATRIC) - ASU DC SPECIAL INSTRUCTIONSFT
Wound Care:   the day AFTER your procedure remove bandage GENTLY, and clean using  mild soap and gentle warm, water stream, pat dry. leave OPEN to air. YOU MAY SHOWER   DO NOT apply lotions, creams, ointments, powder, perfumes to your incision site  DO NOT SOAK your site for 1 week ( no baths, no pools, no tubs, etc...)  Check  your groin and /or wrist daily. A small amount of bruising, and soreness are normal    ACTIVITY: for 24 hours   - DO NOT DRIVE  - DO NOT make any important decisions or sign legal documents   - DO NOT operate heavy machineries   - you may resume sexual activity in 48 hours, unless otherwise instructed by your cardiologist     Your procedure was done through the WRIST: for the NEXT 3DAYS:  - avoid pushing, pulling, with that affected wrist   - avoid repeated movement of that hand and wrist ( eg: typing, hammering)  - DO NOT LIFT anything more than 5 lbs       MEDICATION:   take your medications as explained ( see discharge paperwork)   If you received a STENT, you will be taking antiplatelet medications to KEEP YOUR STENT OPEN ( eg: Aspirin, Plavix, Brilinta, Effient, etc).  Take as prescribed DO NOT STOP taking them without consulting with your cardiologist first.     Follow heart healthy diet recommended by your doctor, if you smoke STOP SMOKING ( may call [phone] for center of tobacco control if you need assistance)     CALL your doctor to make appointment in 2 WEEKS     ***CALL YOUR DOCTOR***  if you experience: fever, chills, body aches, or severe pain, swelling, redness, heat or yellow discharge at incision site  If you experience bleeding or excruciating pain at the procedural site, swelling ( golf ball size) at your procedural site  If you experience CHEST PAIN  If you experience extremity numbness, tingling, temperature change ( of your procedural site)   If you are unable to reach your doctor, you may contact:   -Cardiology Office at [facility] at [phone] or   - [facility] [place] [phone]  - [facility] [phone]

## 2023-02-21 NOTE — ASU DISCHARGE PLAN (ADULT/PEDIATRIC) - CARE PROVIDER_API CALL
Lisker, Jay J (MD)  Cardiovascular Disease; Internal Medicine  [address]  [place] [address]  Phone: [phone]  Fax: [phone]  Follow Up Time: 2 weeks

## 2023-02-21 NOTE — H&P CARDIOLOGY - HISTORY OF PRESENT ILLNESS
52 year old  male with pmhx bicuspid aortic valve and aortic insufficiency. Pt with no symptoms, no chest pain or dyspnea. He continues to exercise by playing tennis and walking uphill. TTE was performed 2/1/23. LVEF 55-60%. He is being followed by Dr. Silva. Pt was referred for cardiac cath by Jay Lisker, MD. 52 year old  male with pmhx bicuspid aortic valve and aortic insufficiency. Pt with no symptoms, no chest pain or dyspnea. He continues to exercise by playing tennis and walking uphill. TTE was performed 2/1/23. LVEF 55-60%. He is being followed by Dr. Silva and scheduled for surgery on 3/14/23. Pt was referred for cardiac cath by Jay Lisker, MD.

## 2023-02-21 NOTE — ASU DISCHARGE PLAN (ADULT/PEDIATRIC) - NS MD DC FALL RISK RISK
[FreeTextEntry1] : 54 yo M with left hand Dupuytren's contracture of digits 4 and 5. \par \par - hand X-Ray\par - f/u after study to discuss surgical release and treatment plan with Dr. Shrestha  For information on Fall & Injury Prevention, visit: [email or web address] OR  [email or web address] OR  https://www.cdc.gov/steadi/patient.html

## 2023-02-21 NOTE — ASU PATIENT PROFILE, ADULT - FALL HARM RISK - UNIVERSAL INTERVENTIONS
Bed in lowest position, wheels locked, appropriate side rails in place/Call bell, personal items and telephone in reach/Instruct patient to call for assistance before getting out of bed or chair/Non-slip footwear when patient is out of bed/[place] to call system/Physically safe environment - no spills, clutter or unnecessary equipment/Purposeful Proactive Rounding/Room/bathroom lighting operational, light cord in reach

## 2023-02-22 ENCOUNTER — OUTPATIENT (OUTPATIENT)
Dept: OUTPATIENT SERVICES | Facility: HOSPITAL | Age: 53
LOS: 1 days | End: 2023-02-22
Payer: COMMERCIAL

## 2023-02-22 ENCOUNTER — APPOINTMENT (OUTPATIENT)
Dept: CT IMAGING | Facility: CLINIC | Age: 53
End: 2023-02-22
Payer: COMMERCIAL

## 2023-02-22 DIAGNOSIS — Z00.8 ENCOUNTER FOR OTHER GENERAL EXAMINATION: ICD-10-CM

## 2023-02-22 PROBLEM — Q23.1 CONGENITAL INSUFFICIENCY OF AORTIC VALVE: Chronic | Status: ACTIVE | Noted: 2023-02-21

## 2023-02-22 PROBLEM — I35.1 NONRHEUMATIC AORTIC (VALVE) INSUFFICIENCY: Chronic | Status: ACTIVE | Noted: 2023-02-21

## 2023-02-22 PROCEDURE — 71250 CT THORAX DX C-: CPT

## 2023-02-22 PROCEDURE — 71250 CT THORAX DX C-: CPT | Mod: 26

## 2023-03-06 PROBLEM — R91.1 LUNG NODULE: Status: ACTIVE | Noted: 2023-03-06

## 2023-03-08 ENCOUNTER — APPOINTMENT (OUTPATIENT)
Dept: NUCLEAR MEDICINE | Facility: CLINIC | Age: 53
End: 2023-03-08
Payer: COMMERCIAL

## 2023-03-08 ENCOUNTER — OUTPATIENT (OUTPATIENT)
Dept: OUTPATIENT SERVICES | Facility: HOSPITAL | Age: 53
LOS: 1 days | End: 2023-03-08

## 2023-03-08 DIAGNOSIS — R91.1 SOLITARY PULMONARY NODULE: ICD-10-CM

## 2023-03-08 PROCEDURE — 78815 PET IMAGE W/CT SKULL-THIGH: CPT | Mod: 26,PI

## 2023-03-09 ENCOUNTER — OUTPATIENT (OUTPATIENT)
Dept: OUTPATIENT SERVICES | Facility: HOSPITAL | Age: 53
LOS: 1 days | End: 2023-03-09
Payer: COMMERCIAL

## 2023-03-09 VITALS
HEIGHT: 78 IN | SYSTOLIC BLOOD PRESSURE: 119 MMHG | OXYGEN SATURATION: 96 % | WEIGHT: 251.11 LBS | HEART RATE: 57 BPM | TEMPERATURE: 98 F | DIASTOLIC BLOOD PRESSURE: 73 MMHG

## 2023-03-09 DIAGNOSIS — Z29.9 ENCOUNTER FOR PROPHYLACTIC MEASURES, UNSPECIFIED: ICD-10-CM

## 2023-03-09 DIAGNOSIS — Z01.818 ENCOUNTER FOR OTHER PREPROCEDURAL EXAMINATION: ICD-10-CM

## 2023-03-09 DIAGNOSIS — Z98.890 OTHER SPECIFIED POSTPROCEDURAL STATES: Chronic | ICD-10-CM

## 2023-03-09 DIAGNOSIS — Q23.1 CONGENITAL INSUFFICIENCY OF AORTIC VALVE: ICD-10-CM

## 2023-03-09 DIAGNOSIS — G47.33 OBSTRUCTIVE SLEEP APNEA (ADULT) (PEDIATRIC): ICD-10-CM

## 2023-03-09 DIAGNOSIS — I35.1 NONRHEUMATIC AORTIC (VALVE) INSUFFICIENCY: ICD-10-CM

## 2023-03-09 LAB
A1C WITH ESTIMATED AVERAGE GLUCOSE RESULT: 5.4 % — SIGNIFICANT CHANGE UP (ref 4–5.6)
ANION GAP SERPL CALC-SCNC: 12 MMOL/L — SIGNIFICANT CHANGE UP (ref 5–17)
BLD GP AB SCN SERPL QL: NEGATIVE — SIGNIFICANT CHANGE UP
BUN SERPL-MCNC: 17 MG/DL — SIGNIFICANT CHANGE UP (ref 7–23)
CALCIUM SERPL-MCNC: 10.6 MG/DL — HIGH (ref 8.4–10.5)
CHLORIDE SERPL-SCNC: 101 MMOL/L — SIGNIFICANT CHANGE UP (ref 96–108)
CO2 SERPL-SCNC: 26 MMOL/L — SIGNIFICANT CHANGE UP (ref 22–31)
CREAT SERPL-MCNC: 0.95 MG/DL — SIGNIFICANT CHANGE UP (ref 0.5–1.3)
EGFR: 96 ML/MIN/1.73M2 — SIGNIFICANT CHANGE UP
ESTIMATED AVERAGE GLUCOSE: 108 MG/DL — SIGNIFICANT CHANGE UP (ref 68–114)
GLUCOSE SERPL-MCNC: 101 MG/DL — HIGH (ref 70–99)
HCT VFR BLD CALC: 46.5 % — SIGNIFICANT CHANGE UP (ref 39–50)
HGB BLD-MCNC: 16 G/DL — SIGNIFICANT CHANGE UP (ref 13–17)
MCHC RBC-ENTMCNC: 30.4 PG — SIGNIFICANT CHANGE UP (ref 27–34)
MCHC RBC-ENTMCNC: 34.4 GM/DL — SIGNIFICANT CHANGE UP (ref 32–36)
MCV RBC AUTO: 88.2 FL — SIGNIFICANT CHANGE UP (ref 80–100)
NRBC # BLD: 0 /100 WBCS — SIGNIFICANT CHANGE UP (ref 0–0)
PLATELET # BLD AUTO: 191 K/UL — SIGNIFICANT CHANGE UP (ref 150–400)
POTASSIUM SERPL-MCNC: 4.5 MMOL/L — SIGNIFICANT CHANGE UP (ref 3.5–5.3)
POTASSIUM SERPL-SCNC: 4.5 MMOL/L — SIGNIFICANT CHANGE UP (ref 3.5–5.3)
RBC # BLD: 5.27 M/UL — SIGNIFICANT CHANGE UP (ref 4.2–5.8)
RBC # FLD: 12 % — SIGNIFICANT CHANGE UP (ref 10.3–14.5)
RH IG SCN BLD-IMP: POSITIVE — SIGNIFICANT CHANGE UP
SODIUM SERPL-SCNC: 139 MMOL/L — SIGNIFICANT CHANGE UP (ref 135–145)
WBC # BLD: 6.15 K/UL — SIGNIFICANT CHANGE UP (ref 3.8–10.5)
WBC # FLD AUTO: 6.15 K/UL — SIGNIFICANT CHANGE UP (ref 3.8–10.5)

## 2023-03-09 PROCEDURE — 83036 HEMOGLOBIN GLYCOSYLATED A1C: CPT

## 2023-03-09 PROCEDURE — 86900 BLOOD TYPING SEROLOGIC ABO: CPT

## 2023-03-09 PROCEDURE — 93880 EXTRACRANIAL BILAT STUDY: CPT

## 2023-03-09 PROCEDURE — 93880 EXTRACRANIAL BILAT STUDY: CPT | Mod: 26

## 2023-03-09 PROCEDURE — 71046 X-RAY EXAM CHEST 2 VIEWS: CPT | Mod: 26

## 2023-03-09 PROCEDURE — 36415 COLL VENOUS BLD VENIPUNCTURE: CPT

## 2023-03-09 PROCEDURE — 85027 COMPLETE CBC AUTOMATED: CPT

## 2023-03-09 PROCEDURE — 86850 RBC ANTIBODY SCREEN: CPT

## 2023-03-09 PROCEDURE — 80048 BASIC METABOLIC PNL TOTAL CA: CPT

## 2023-03-09 PROCEDURE — G0463: CPT

## 2023-03-09 PROCEDURE — 86901 BLOOD TYPING SEROLOGIC RH(D): CPT

## 2023-03-09 PROCEDURE — 87640 STAPH A DNA AMP PROBE: CPT

## 2023-03-09 PROCEDURE — 87641 MR-STAPH DNA AMP PROBE: CPT

## 2023-03-09 PROCEDURE — 71046 X-RAY EXAM CHEST 2 VIEWS: CPT

## 2023-03-09 RX ORDER — CHLORHEXIDINE GLUCONATE 213 G/1000ML
1 SOLUTION TOPICAL ONCE
Refills: 0 | Status: DISCONTINUED | OUTPATIENT
Start: 2023-03-14 | End: 2023-03-14

## 2023-03-09 RX ORDER — CEFUROXIME AXETIL 250 MG
1500 TABLET ORAL ONCE
Refills: 0 | Status: DISCONTINUED | OUTPATIENT
Start: 2023-03-14 | End: 2023-03-14

## 2023-03-09 RX ORDER — LIDOCAINE HCL 20 MG/ML
0.2 VIAL (ML) INJECTION ONCE
Refills: 0 | Status: DISCONTINUED | OUTPATIENT
Start: 2023-03-14 | End: 2023-03-14

## 2023-03-09 NOTE — H&P PST ADULT - PROBLEM SELECTOR PLAN 1
Minimally Invasive Aortic Valve Replacement  Pt. instructed to take his scheduled dose of atenolol morning of procedure with minimal water

## 2023-03-09 NOTE — H&P PST ADULT - HISTORY OF PRESENT ILLNESS
52 year old male, with a history of bicuspid aortic valve, presents for minimally invasive aortic valve replacement. Pt. reports experiencing mild dyspnea on exertion and denies exertional chest discomfort. Pt. reports lung nodule was incidentally noted on diagnostic imaging, s/p PET scan 3/8/23 as per request of Dr. Silva.    Pt. reports a mild case of COVID-19 2/21, with residual diminished sense of smell. Pre procedure PCR scheduled for 3/10/23. 52 year old male, with a history of bicuspid aortic valve, presents for minimally invasive aortic valve replacement. Pt. reports experiencing mild dyspnea on exertion and denies exertional chest discomfort. Pt. reports lung nodule was incidentally noted on diagnostic imaging, s/p PET scan 3/8/23 as per request of Dr. Silva.    Pt. reports a mild case of COVID-19 February 2021, with residual diminished sense of smell. Pre procedure PCR scheduled for 3/10/23.

## 2023-03-09 NOTE — H&P PST ADULT - HISTORY OF COVID-19 VACCINATION
[FreeTextEntry3] : \par Injection Procedure Note:\par \par The risks, benefits, and alternatives to corticosteroid injection were reviewed with the patient.  Risks outlined include but are not limited to infection, sepsis, bleeding, scarring, skin discoloration, temporary increase in pain, syncopal episode, failure to resolve symptoms, symptoms recurrence, allergic reaction, flare reaction, and elevation of blood sugar in diabetics.  Patient understood the risks and asked to proceed with this treatment course.\par \par Patient Identification\par Name/: Verbal with patient and/or family\par \par Procedure Verification:\par Procedure confirmed with patient or family/designee\par Consent for procedure: Verbal Consent Given\par Relevant documentation completed, reviewed, and signed\par Clinical indications for procedure confirmed\par \par Time-out with all members of procedure team immediately prior to procedure:\par Correct patient identified. Agreement on procedure. Correct side and site.\par \par KNEE INJECTION (STEROID) - LEFT\par After verbal consent and identification of the correct patient and correct site, the superolateral left knee was prepped using alcohol swabs and betadine. This was allowed time to air dry. A mixture of 1cc DepoMedrol 40mg/ml, 3cc Lidocaine 1%, and 3cc Bupivacaine 0.5% was injected into the suprapatellar pouch using a sterile 22G needle after ethyl chloride spray for skin anesthesia. The patient tolerated the procedure well. After-care instructions were provided and included instructions to ice the area and to call if redness, pain, or fever develop.\par  Yes

## 2023-03-09 NOTE — H&P PST ADULT - ASSESSMENT
Airway  Mallampati III  Denies dentures or loose teeth    Activity  DASI 9.89; Doubles Tennis weekly    CAPRINI SCORE [CLOT]    AGE RELATED RISK FACTORS                                                       MOBILITY RELATED FACTORS  [X ] Age 41-60 years                                            (1 Point)                  [ ] Bed rest                                                        (1 Point)  [ ] Age: 61-74 years                                           (2 Points)                 [ ] Plaster cast                                                   (2 Points)  [ ] Age= 75 years                                              (3 Points)                 [ ] Bed bound for more than 72 hours                 (2 Points)    DISEASE RELATED RISK FACTORS                                               GENDER SPECIFIC FACTORS  [ ] Edema in the lower extremities                       (1 Point)                  [ ] Pregnancy                                                     (1 Point)  [ ] Varicose veins                                               (1 Point)                  [ ] Post-partum < 6 weeks                                   (1 Point)             [X ] BMI > 25 Kg/m2                                            (1 Point)                  [ ] Hormonal therapy  or oral contraception          (1 Point)                 [ ] Sepsis (in the previous month)                        (1 Point)                  [ ] History of pregnancy complications                 (1 point)  [ ] Pneumonia or serious lung disease                                               [ ] Unexplained or recurrent                     (1 Point)           (in the previous month)                               (1 Point)  [ ] Abnormal pulmonary function test                     (1 Point)                 SURGERY RELATED RISK FACTORS  [ ] Acute myocardial infarction                              (1 Point)                 [ ]  Section                                             (1 Point)  [ ] Congestive heart failure (in the previous month)  (1 Point)               [ ] Minor surgery                                                  (1 Point)   [ ] Inflammatory bowel disease                             (1 Point)                 [ ] Arthroscopic surgery                                        (2 Points)  [ ] Central venous access                                      (2 Points)                [X ] General surgery lasting more than 45 minutes   (2 Points)       [ ] Stroke (in the previous month)                          (5 Points)               [ ] Elective arthroplasty                                         (5 Points)                                                                                                                                               HEMATOLOGY RELATED FACTORS                                                 TRAUMA RELATED RISK FACTORS  [ ] Prior episodes of VTE                                     (3 Points)                 [ ] Fracture of the hip, pelvis, or leg                       (5 Points)  [ ] Positive family history for VTE                         (3 Points)                 [ ] Acute spinal cord injury (in the previous month)  (5 Points)  [ ] Prothrombin 92039 A                                     (3 Points)                 [ ] Paralysis  (less than 1 month)                             (5 Points)  [ ] Factor V Leiden                                             (3 Points)                  [ ] Multiple Trauma within 1 month                        (5 Points)  [ ] Lupus anticoagulants                                     (3 Points)                                                           [ ] Anticardiolipin antibodies                               (3 Points)                                                       [ ] High homocysteine in the blood                      (3 Points)                                             [ ] Other congenital or acquired thrombophilia      (3 Points)                                                [ ] Heparin induced thrombocytopenia                  (3 Points)                                          Total Score [    4      ]

## 2023-03-09 NOTE — H&P PST ADULT - NSICDXPASTMEDICALHX_GEN_ALL_CORE_FT
PAST MEDICAL HISTORY:  Aortic insufficiency     Bicuspid aortic valve      PAST MEDICAL HISTORY:  Aortic insufficiency     Bicuspid aortic valve     COVID-19     Lung nodule

## 2023-03-09 NOTE — H&P PST ADULT - NSANTHOSAYNRD_GEN_A_CORE
No. YONY screening performed.  STOP BANG Legend: 0-2 = LOW Risk; 3-4 = INTERMEDIATE Risk; 5-8 = HIGH Risk

## 2023-03-10 ENCOUNTER — OUTPATIENT (OUTPATIENT)
Dept: OUTPATIENT SERVICES | Facility: HOSPITAL | Age: 53
LOS: 1 days | End: 2023-03-10
Payer: COMMERCIAL

## 2023-03-10 DIAGNOSIS — Z98.890 OTHER SPECIFIED POSTPROCEDURAL STATES: Chronic | ICD-10-CM

## 2023-03-10 DIAGNOSIS — Z11.52 ENCOUNTER FOR SCREENING FOR COVID-19: ICD-10-CM

## 2023-03-10 LAB
MRSA PCR RESULT.: SIGNIFICANT CHANGE UP
S AUREUS DNA NOSE QL NAA+PROBE: SIGNIFICANT CHANGE UP
SARS-COV-2 RNA SPEC QL NAA+PROBE: SIGNIFICANT CHANGE UP

## 2023-03-10 PROCEDURE — C9803: CPT

## 2023-03-10 PROCEDURE — U0005: CPT

## 2023-03-10 PROCEDURE — U0003: CPT

## 2023-03-13 ENCOUNTER — TRANSCRIPTION ENCOUNTER (OUTPATIENT)
Age: 53
End: 2023-03-13

## 2023-03-14 ENCOUNTER — INPATIENT (INPATIENT)
Facility: HOSPITAL | Age: 53
LOS: 7 days | Discharge: HOME HEALTH | DRG: 219 | End: 2023-03-22
Attending: THORACIC SURGERY (CARDIOTHORACIC VASCULAR SURGERY) | Admitting: THORACIC SURGERY (CARDIOTHORACIC VASCULAR SURGERY)
Payer: COMMERCIAL

## 2023-03-14 ENCOUNTER — RESULT REVIEW (OUTPATIENT)
Age: 53
End: 2023-03-14

## 2023-03-14 ENCOUNTER — APPOINTMENT (OUTPATIENT)
Dept: CARDIOTHORACIC SURGERY | Facility: HOSPITAL | Age: 53
End: 2023-03-14

## 2023-03-14 VITALS
OXYGEN SATURATION: 96 % | TEMPERATURE: 98 F | SYSTOLIC BLOOD PRESSURE: 128 MMHG | HEART RATE: 58 BPM | WEIGHT: 259.26 LBS | DIASTOLIC BLOOD PRESSURE: 66 MMHG | HEIGHT: 77.99 IN | RESPIRATION RATE: 18 BRPM

## 2023-03-14 DIAGNOSIS — I35.1 NONRHEUMATIC AORTIC (VALVE) INSUFFICIENCY: ICD-10-CM

## 2023-03-14 DIAGNOSIS — Z98.890 OTHER SPECIFIED POSTPROCEDURAL STATES: Chronic | ICD-10-CM

## 2023-03-14 LAB
ALBUMIN SERPL ELPH-MCNC: 4.2 G/DL — SIGNIFICANT CHANGE UP (ref 3.3–5)
ALP SERPL-CCNC: 44 U/L — SIGNIFICANT CHANGE UP (ref 40–120)
ALT FLD-CCNC: 24 U/L — SIGNIFICANT CHANGE UP (ref 10–45)
ANION GAP SERPL CALC-SCNC: 13 MMOL/L — SIGNIFICANT CHANGE UP (ref 5–17)
APTT BLD: 28.9 SEC — SIGNIFICANT CHANGE UP (ref 27.5–35.5)
AST SERPL-CCNC: 37 U/L — SIGNIFICANT CHANGE UP (ref 10–40)
BASE EXCESS BLDV CALC-SCNC: -1.7 MMOL/L — SIGNIFICANT CHANGE UP (ref -2–3)
BASE EXCESS BLDV CALC-SCNC: -2.2 MMOL/L — LOW (ref -2–3)
BASE EXCESS BLDV CALC-SCNC: -3.6 MMOL/L — LOW (ref -2–3)
BASE EXCESS BLDV CALC-SCNC: -3.8 MMOL/L — LOW (ref -2–3)
BASE EXCESS BLDV CALC-SCNC: 3.8 MMOL/L — HIGH (ref -2–3)
BASOPHILS # BLD AUTO: 0.05 K/UL — SIGNIFICANT CHANGE UP (ref 0–0.2)
BASOPHILS NFR BLD AUTO: 0.5 % — SIGNIFICANT CHANGE UP (ref 0–2)
BILIRUB SERPL-MCNC: 1 MG/DL — SIGNIFICANT CHANGE UP (ref 0.2–1.2)
BLOOD GAS VENOUS - CREATININE: SIGNIFICANT CHANGE UP MG/DL (ref 0.5–1.3)
BUN SERPL-MCNC: 17 MG/DL — SIGNIFICANT CHANGE UP (ref 7–23)
CA-I SERPL-SCNC: 0.84 MMOL/L — LOW (ref 1.15–1.33)
CA-I SERPL-SCNC: 0.85 MMOL/L — LOW (ref 1.15–1.33)
CA-I SERPL-SCNC: 0.94 MMOL/L — LOW (ref 1.15–1.33)
CA-I SERPL-SCNC: 0.99 MMOL/L — LOW (ref 1.15–1.33)
CA-I SERPL-SCNC: 1.42 MMOL/L — HIGH (ref 1.15–1.33)
CALCIUM SERPL-MCNC: 9.7 MG/DL — SIGNIFICANT CHANGE UP (ref 8.4–10.5)
CHLORIDE BLDV-SCNC: 104 MMOL/L — SIGNIFICANT CHANGE UP (ref 96–108)
CHLORIDE BLDV-SCNC: 104 MMOL/L — SIGNIFICANT CHANGE UP (ref 96–108)
CHLORIDE BLDV-SCNC: 108 MMOL/L — SIGNIFICANT CHANGE UP (ref 96–108)
CHLORIDE BLDV-SCNC: 109 MMOL/L — HIGH (ref 96–108)
CHLORIDE BLDV-SCNC: 112 MMOL/L — HIGH (ref 96–108)
CHLORIDE SERPL-SCNC: 109 MMOL/L — HIGH (ref 96–108)
CK MB BLD-MCNC: 7.1 % — HIGH (ref 0–3.5)
CK MB CFR SERPL CALC: 23.7 NG/ML — HIGH (ref 0–6.7)
CK SERPL-CCNC: 332 U/L — HIGH (ref 30–200)
CO2 BLDV-SCNC: 26 MMOL/L — SIGNIFICANT CHANGE UP (ref 22–26)
CO2 BLDV-SCNC: 32 MMOL/L — HIGH (ref 22–26)
CO2 SERPL-SCNC: 22 MMOL/L — SIGNIFICANT CHANGE UP (ref 22–31)
CREAT SERPL-MCNC: 0.92 MG/DL — SIGNIFICANT CHANGE UP (ref 0.5–1.3)
EGFR: 100 ML/MIN/1.73M2 — SIGNIFICANT CHANGE UP
EOSINOPHIL # BLD AUTO: 0.02 K/UL — SIGNIFICANT CHANGE UP (ref 0–0.5)
EOSINOPHIL NFR BLD AUTO: 0.2 % — SIGNIFICANT CHANGE UP (ref 0–6)
FIBRINOGEN PPP-MCNC: 288 MG/DL — SIGNIFICANT CHANGE UP (ref 200–445)
GAS PNL BLDA: SIGNIFICANT CHANGE UP
GAS PNL BLDV: 135 MMOL/L — LOW (ref 136–145)
GAS PNL BLDV: 135 MMOL/L — LOW (ref 136–145)
GAS PNL BLDV: 138 MMOL/L — SIGNIFICANT CHANGE UP (ref 136–145)
GAS PNL BLDV: 139 MMOL/L — SIGNIFICANT CHANGE UP (ref 136–145)
GAS PNL BLDV: 139 MMOL/L — SIGNIFICANT CHANGE UP (ref 136–145)
GAS PNL BLDV: SIGNIFICANT CHANGE UP
GLUCOSE BLDC GLUCOMTR-MCNC: 116 MG/DL — HIGH (ref 70–99)
GLUCOSE BLDC GLUCOMTR-MCNC: 139 MG/DL — HIGH (ref 70–99)
GLUCOSE BLDC GLUCOMTR-MCNC: 142 MG/DL — HIGH (ref 70–99)
GLUCOSE BLDC GLUCOMTR-MCNC: 145 MG/DL — HIGH (ref 70–99)
GLUCOSE BLDC GLUCOMTR-MCNC: 166 MG/DL — HIGH (ref 70–99)
GLUCOSE BLDV-MCNC: 100 MG/DL — HIGH (ref 70–99)
GLUCOSE BLDV-MCNC: 118 MG/DL — HIGH (ref 70–99)
GLUCOSE BLDV-MCNC: 121 MG/DL — HIGH (ref 70–99)
GLUCOSE BLDV-MCNC: 141 MG/DL — HIGH (ref 70–99)
GLUCOSE BLDV-MCNC: 148 MG/DL — HIGH (ref 70–99)
GLUCOSE SERPL-MCNC: 133 MG/DL — HIGH (ref 70–99)
HCO3 BLDV-SCNC: 24 MMOL/L — SIGNIFICANT CHANGE UP (ref 22–29)
HCO3 BLDV-SCNC: 25 MMOL/L — SIGNIFICANT CHANGE UP (ref 22–29)
HCO3 BLDV-SCNC: 30 MMOL/L — HIGH (ref 22–29)
HCT VFR BLD CALC: 31.5 % — LOW (ref 39–50)
HCT VFR BLDA CALC: 35 % — LOW (ref 39–51)
HCT VFR BLDA CALC: 36 % — LOW (ref 39–51)
HCT VFR BLDA CALC: 39 % — SIGNIFICANT CHANGE UP (ref 39–51)
HGB BLD CALC-MCNC: 11.5 G/DL — LOW (ref 12.6–17.4)
HGB BLD CALC-MCNC: 11.9 G/DL — LOW (ref 12.6–17.4)
HGB BLD CALC-MCNC: 12 G/DL — LOW (ref 12.6–17.4)
HGB BLD CALC-MCNC: 12.1 G/DL — LOW (ref 12.6–17.4)
HGB BLD CALC-MCNC: 13 G/DL — SIGNIFICANT CHANGE UP (ref 12.6–17.4)
HGB BLD-MCNC: 11.3 G/DL — LOW (ref 13–17)
IMM GRANULOCYTES NFR BLD AUTO: 1.6 % — HIGH (ref 0–0.9)
INR BLD: 1.14 RATIO — SIGNIFICANT CHANGE UP (ref 0.88–1.16)
LACTATE BLDV-MCNC: 1.1 MMOL/L — SIGNIFICANT CHANGE UP (ref 0.5–2)
LACTATE BLDV-MCNC: 1.3 MMOL/L — SIGNIFICANT CHANGE UP (ref 0.5–2)
LACTATE BLDV-MCNC: 1.8 MMOL/L — SIGNIFICANT CHANGE UP (ref 0.5–2)
LYMPHOCYTES # BLD AUTO: 0.93 K/UL — LOW (ref 1–3.3)
LYMPHOCYTES # BLD AUTO: 9.2 % — LOW (ref 13–44)
MAGNESIUM SERPL-MCNC: 2.6 MG/DL — SIGNIFICANT CHANGE UP (ref 1.6–2.6)
MCHC RBC-ENTMCNC: 30.9 PG — SIGNIFICANT CHANGE UP (ref 27–34)
MCHC RBC-ENTMCNC: 35.9 GM/DL — SIGNIFICANT CHANGE UP (ref 32–36)
MCV RBC AUTO: 86.1 FL — SIGNIFICANT CHANGE UP (ref 80–100)
MONOCYTES # BLD AUTO: 0.57 K/UL — SIGNIFICANT CHANGE UP (ref 0–0.9)
MONOCYTES NFR BLD AUTO: 5.6 % — SIGNIFICANT CHANGE UP (ref 2–14)
NEUTROPHILS # BLD AUTO: 8.39 K/UL — HIGH (ref 1.8–7.4)
NEUTROPHILS NFR BLD AUTO: 82.9 % — HIGH (ref 43–77)
NRBC # BLD: 0 /100 WBCS — SIGNIFICANT CHANGE UP (ref 0–0)
PCO2 BLDV: 45 MMHG — SIGNIFICANT CHANGE UP (ref 42–55)
PCO2 BLDV: 50 MMHG — SIGNIFICANT CHANGE UP (ref 42–55)
PCO2 BLDV: 52 MMHG — SIGNIFICANT CHANGE UP (ref 42–55)
PCO2 BLDV: 55 MMHG — SIGNIFICANT CHANGE UP (ref 42–55)
PCO2 BLDV: 55 MMHG — SIGNIFICANT CHANGE UP (ref 42–55)
PH BLDV: 7.25 — LOW (ref 7.32–7.43)
PH BLDV: 7.25 — LOW (ref 7.32–7.43)
PH BLDV: 7.3 — LOW (ref 7.32–7.43)
PH BLDV: 7.34 — SIGNIFICANT CHANGE UP (ref 7.32–7.43)
PH BLDV: 7.37 — SIGNIFICANT CHANGE UP (ref 7.32–7.43)
PHOSPHATE SERPL-MCNC: 3.7 MG/DL — SIGNIFICANT CHANGE UP (ref 2.5–4.5)
PLATELET # BLD AUTO: 113 K/UL — LOW (ref 150–400)
PO2 BLDV: 56 MMHG — HIGH (ref 25–45)
PO2 BLDV: 58 MMHG — HIGH (ref 25–45)
PO2 BLDV: 73 MMHG — HIGH (ref 25–45)
PO2 BLDV: 73 MMHG — HIGH (ref 25–45)
PO2 BLDV: 76 MMHG — HIGH (ref 25–45)
POTASSIUM BLDV-SCNC: 5 MMOL/L — SIGNIFICANT CHANGE UP (ref 3.5–5.1)
POTASSIUM BLDV-SCNC: 6 MMOL/L — HIGH (ref 3.5–5.1)
POTASSIUM BLDV-SCNC: 6.3 MMOL/L — CRITICAL HIGH (ref 3.5–5.1)
POTASSIUM BLDV-SCNC: 6.6 MMOL/L — CRITICAL HIGH (ref 3.5–5.1)
POTASSIUM BLDV-SCNC: 6.8 MMOL/L — CRITICAL HIGH (ref 3.5–5.1)
POTASSIUM SERPL-MCNC: 5 MMOL/L — SIGNIFICANT CHANGE UP (ref 3.5–5.3)
POTASSIUM SERPL-SCNC: 5 MMOL/L — SIGNIFICANT CHANGE UP (ref 3.5–5.3)
PROT SERPL-MCNC: 5.9 G/DL — LOW (ref 6–8.3)
PROTHROM AB SERPL-ACNC: 13.1 SEC — SIGNIFICANT CHANGE UP (ref 10.5–13.4)
RBC # BLD: 3.66 M/UL — LOW (ref 4.2–5.8)
RBC # FLD: 12.1 % — SIGNIFICANT CHANGE UP (ref 10.3–14.5)
RH IG SCN BLD-IMP: POSITIVE — SIGNIFICANT CHANGE UP
SAO2 % BLDV: 86.7 % — SIGNIFICANT CHANGE UP (ref 67–88)
SAO2 % BLDV: 89.7 % — HIGH (ref 67–88)
SAO2 % BLDV: 94 % — HIGH (ref 67–88)
SAO2 % BLDV: 95.1 % — HIGH (ref 67–88)
SAO2 % BLDV: 95.6 % — HIGH (ref 67–88)
SODIUM SERPL-SCNC: 144 MMOL/L — SIGNIFICANT CHANGE UP (ref 135–145)
TROPONIN T, HIGH SENSITIVITY RESULT: 451 NG/L — HIGH (ref 0–51)
WBC # BLD: 10.12 K/UL — SIGNIFICANT CHANGE UP (ref 3.8–10.5)
WBC # FLD AUTO: 10.12 K/UL — SIGNIFICANT CHANGE UP (ref 3.8–10.5)

## 2023-03-14 PROCEDURE — 99291 CRITICAL CARE FIRST HOUR: CPT

## 2023-03-14 PROCEDURE — 71045 X-RAY EXAM CHEST 1 VIEW: CPT | Mod: 26

## 2023-03-14 PROCEDURE — 88305 TISSUE EXAM BY PATHOLOGIST: CPT | Mod: 26

## 2023-03-14 PROCEDURE — 33411 REPLACEMENT OF AORTIC VALVE: CPT

## 2023-03-14 PROCEDURE — 33411 REPLACEMENT OF AORTIC VALVE: CPT | Mod: AS

## 2023-03-14 DEVICE — CANNULA AORTIC ROOT WITH VENT LINE 9G X 13.3CM FLANGED PRESSURE MONITORING: Type: IMPLANTABLE DEVICE | Status: FUNCTIONAL

## 2023-03-14 DEVICE — VALVE AORTIC INSPIRIS RESILIA 27MM: Type: IMPLANTABLE DEVICE | Status: FUNCTIONAL

## 2023-03-14 DEVICE — LIGATING CLIPS WECK HORIZON LARGE (ORANGE) 6: Type: IMPLANTABLE DEVICE | Status: FUNCTIONAL

## 2023-03-14 DEVICE — CANNULA RETROGRADE CARDIOPLEGIA SELF-INFLATING 14FR PRE-SHAPED STYLET/HANDLE: Type: IMPLANTABLE DEVICE | Status: FUNCTIONAL

## 2023-03-14 DEVICE — IMPLANTABLE DEVICE: Type: IMPLANTABLE DEVICE | Status: FUNCTIONAL

## 2023-03-14 DEVICE — CANNULA CORONARY SILICONE OSTIAL 20FR: Type: IMPLANTABLE DEVICE | Status: FUNCTIONAL

## 2023-03-14 DEVICE — INTRODUCER PERCUTANEOUS INSERTION KIT: Type: IMPLANTABLE DEVICE | Status: FUNCTIONAL

## 2023-03-14 DEVICE — CANNULA ARTERIAL OPTISITE 22FR X 3/8" VENTED: Type: IMPLANTABLE DEVICE | Status: FUNCTIONAL

## 2023-03-14 DEVICE — LIGATING CLIPS WECK HORIZON SMALL-WIDE (RED) 24: Type: IMPLANTABLE DEVICE | Status: FUNCTIONAL

## 2023-03-14 DEVICE — CATH VENT VENTRICULAR PVC 18FR X 4.25" TIP PERFORATION: Type: IMPLANTABLE DEVICE | Status: FUNCTIONAL

## 2023-03-14 DEVICE — PERI GUARD PERICARDIUM 4X4: Type: IMPLANTABLE DEVICE | Status: FUNCTIONAL

## 2023-03-14 DEVICE — FELT PTFE 6 X 6": Type: IMPLANTABLE DEVICE | Status: FUNCTIONAL

## 2023-03-14 DEVICE — CATH VENT LEFT HEART SILICONE 16FR NON-VENTED: Type: IMPLANTABLE DEVICE | Status: FUNCTIONAL

## 2023-03-14 DEVICE — COR-KNOT MINI DEVICE COMBO KIT: Type: IMPLANTABLE DEVICE | Status: FUNCTIONAL

## 2023-03-14 DEVICE — CANNULA FEM VENOUS RAP 23-25FR: Type: IMPLANTABLE DEVICE | Status: FUNCTIONAL

## 2023-03-14 DEVICE — KIT A-LINE 1LUM 20G X 12CM SAFE KIT: Type: IMPLANTABLE DEVICE | Status: FUNCTIONAL

## 2023-03-14 DEVICE — KIT CVC 1LUM 16GX20CM BLU FLX TIP: Type: IMPLANTABLE DEVICE | Status: FUNCTIONAL

## 2023-03-14 DEVICE — SURGIFLO MATRIX WITH THROMBIN KIT: Type: IMPLANTABLE DEVICE | Status: FUNCTIONAL

## 2023-03-14 DEVICE — INTRO MICROPUNC STIFF 5FRX10CM: Type: IMPLANTABLE DEVICE | Status: FUNCTIONAL

## 2023-03-14 DEVICE — CANNULA CORONARY OSTIAL 12FR X 6" BASKET TIP: Type: IMPLANTABLE DEVICE | Status: FUNCTIONAL

## 2023-03-14 DEVICE — SET INTRO MICROPUNCT STIFF ECHOTIP 4FX10CM: Type: IMPLANTABLE DEVICE | Status: FUNCTIONAL

## 2023-03-14 DEVICE — LIGATING CLIPS WECK HORIZON MEDIUM (BLUE) 24: Type: IMPLANTABLE DEVICE | Status: FUNCTIONAL

## 2023-03-14 DEVICE — GWIRE AMPLATZ G200319 89MM FOR STUDY ONLY: Type: IMPLANTABLE DEVICE | Status: FUNCTIONAL

## 2023-03-14 DEVICE — CATH VASC EXPO MULTIPURPOSE A CURVE MPA2 SH 5FR X 10CM: Type: IMPLANTABLE DEVICE | Status: FUNCTIONAL

## 2023-03-14 RX ORDER — METOCLOPRAMIDE HCL 10 MG
10 TABLET ORAL EVERY 8 HOURS
Refills: 0 | Status: COMPLETED | OUTPATIENT
Start: 2023-03-14 | End: 2023-03-16

## 2023-03-14 RX ORDER — CEFUROXIME AXETIL 250 MG
1500 TABLET ORAL EVERY 8 HOURS
Refills: 0 | Status: COMPLETED | OUTPATIENT
Start: 2023-03-14 | End: 2023-03-15

## 2023-03-14 RX ORDER — ALBUMIN HUMAN 25 %
250 VIAL (ML) INTRAVENOUS ONCE
Refills: 0 | Status: COMPLETED | OUTPATIENT
Start: 2023-03-14 | End: 2023-03-14

## 2023-03-14 RX ORDER — MEPERIDINE HYDROCHLORIDE 50 MG/ML
25 INJECTION INTRAMUSCULAR; INTRAVENOUS; SUBCUTANEOUS ONCE
Refills: 0 | Status: DISCONTINUED | OUTPATIENT
Start: 2023-03-14 | End: 2023-03-15

## 2023-03-14 RX ORDER — AMIODARONE HYDROCHLORIDE 400 MG/1
400 TABLET ORAL
Refills: 0 | Status: COMPLETED | OUTPATIENT
Start: 2023-03-14 | End: 2023-03-17

## 2023-03-14 RX ORDER — CHLORHEXIDINE GLUCONATE 213 G/1000ML
1 SOLUTION TOPICAL DAILY
Refills: 0 | Status: DISCONTINUED | OUTPATIENT
Start: 2023-03-14 | End: 2023-03-22

## 2023-03-14 RX ORDER — OXYCODONE HYDROCHLORIDE 5 MG/1
10 TABLET ORAL EVERY 4 HOURS
Refills: 0 | Status: DISCONTINUED | OUTPATIENT
Start: 2023-03-14 | End: 2023-03-14

## 2023-03-14 RX ORDER — INSULIN HUMAN 100 [IU]/ML
3 INJECTION, SOLUTION SUBCUTANEOUS
Qty: 100 | Refills: 0 | Status: DISCONTINUED | OUTPATIENT
Start: 2023-03-14 | End: 2023-03-16

## 2023-03-14 RX ORDER — HYDROMORPHONE HYDROCHLORIDE 2 MG/ML
0.5 INJECTION INTRAMUSCULAR; INTRAVENOUS; SUBCUTANEOUS ONCE
Refills: 0 | Status: DISCONTINUED | OUTPATIENT
Start: 2023-03-14 | End: 2023-03-14

## 2023-03-14 RX ORDER — SENNA PLUS 8.6 MG/1
2 TABLET ORAL AT BEDTIME
Refills: 0 | Status: DISCONTINUED | OUTPATIENT
Start: 2023-03-15 | End: 2023-03-22

## 2023-03-14 RX ORDER — ASPIRIN/CALCIUM CARB/MAGNESIUM 324 MG
300 TABLET ORAL ONCE
Refills: 0 | Status: DISCONTINUED | OUTPATIENT
Start: 2023-03-14 | End: 2023-03-14

## 2023-03-14 RX ORDER — DEXTROSE 50 % IN WATER 50 %
50 SYRINGE (ML) INTRAVENOUS
Refills: 0 | Status: DISCONTINUED | OUTPATIENT
Start: 2023-03-14 | End: 2023-03-14

## 2023-03-14 RX ORDER — ASCORBIC ACID 60 MG
500 TABLET,CHEWABLE ORAL
Refills: 0 | Status: DISCONTINUED | OUTPATIENT
Start: 2023-03-14 | End: 2023-03-14

## 2023-03-14 RX ORDER — HYDROMORPHONE HYDROCHLORIDE 2 MG/ML
30 INJECTION INTRAMUSCULAR; INTRAVENOUS; SUBCUTANEOUS
Refills: 0 | Status: DISCONTINUED | OUTPATIENT
Start: 2023-03-14 | End: 2023-03-17

## 2023-03-14 RX ORDER — AMIODARONE HYDROCHLORIDE 400 MG/1
400 TABLET ORAL
Refills: 0 | Status: DISCONTINUED | OUTPATIENT
Start: 2023-03-14 | End: 2023-03-14

## 2023-03-14 RX ORDER — ASCORBIC ACID 60 MG
500 TABLET,CHEWABLE ORAL
Refills: 0 | Status: COMPLETED | OUTPATIENT
Start: 2023-03-14 | End: 2023-03-19

## 2023-03-14 RX ORDER — POTASSIUM CHLORIDE 20 MEQ
10 PACKET (EA) ORAL
Refills: 0 | Status: DISCONTINUED | OUTPATIENT
Start: 2023-03-14 | End: 2023-03-16

## 2023-03-14 RX ORDER — OXYCODONE HYDROCHLORIDE 5 MG/1
5 TABLET ORAL EVERY 4 HOURS
Refills: 0 | Status: DISCONTINUED | OUTPATIENT
Start: 2023-03-14 | End: 2023-03-21

## 2023-03-14 RX ORDER — SODIUM CHLORIDE 9 MG/ML
1000 INJECTION INTRAMUSCULAR; INTRAVENOUS; SUBCUTANEOUS
Refills: 0 | Status: DISCONTINUED | OUTPATIENT
Start: 2023-03-14 | End: 2023-03-14

## 2023-03-14 RX ORDER — INSULIN HUMAN 100 [IU]/ML
3 INJECTION, SOLUTION SUBCUTANEOUS
Qty: 100 | Refills: 0 | Status: DISCONTINUED | OUTPATIENT
Start: 2023-03-14 | End: 2023-03-14

## 2023-03-14 RX ORDER — ACETAMINOPHEN 500 MG
650 TABLET ORAL EVERY 6 HOURS
Refills: 0 | Status: COMPLETED | OUTPATIENT
Start: 2023-03-14 | End: 2023-03-17

## 2023-03-14 RX ORDER — DEXMEDETOMIDINE HYDROCHLORIDE IN 0.9% SODIUM CHLORIDE 4 UG/ML
1 INJECTION INTRAVENOUS
Qty: 200 | Refills: 0 | Status: DISCONTINUED | OUTPATIENT
Start: 2023-03-14 | End: 2023-03-14

## 2023-03-14 RX ORDER — PANTOPRAZOLE SODIUM 20 MG/1
40 TABLET, DELAYED RELEASE ORAL EVERY 24 HOURS
Refills: 0 | Status: DISCONTINUED | OUTPATIENT
Start: 2023-03-14 | End: 2023-03-15

## 2023-03-14 RX ORDER — ACETAMINOPHEN 500 MG
650 TABLET ORAL EVERY 6 HOURS
Refills: 0 | Status: CANCELLED | OUTPATIENT
Start: 2023-03-17 | End: 2023-03-14

## 2023-03-14 RX ORDER — POTASSIUM CHLORIDE 20 MEQ
10 PACKET (EA) ORAL
Refills: 0 | Status: DISCONTINUED | OUTPATIENT
Start: 2023-03-14 | End: 2023-03-14

## 2023-03-14 RX ORDER — GABAPENTIN 400 MG/1
300 CAPSULE ORAL ONCE
Refills: 0 | Status: COMPLETED | OUTPATIENT
Start: 2023-03-14 | End: 2023-03-14

## 2023-03-14 RX ORDER — POLYETHYLENE GLYCOL 3350 17 G/17G
17 POWDER, FOR SOLUTION ORAL DAILY
Refills: 0 | Status: DISCONTINUED | OUTPATIENT
Start: 2023-03-15 | End: 2023-03-22

## 2023-03-14 RX ORDER — ACETAMINOPHEN 500 MG
650 TABLET ORAL EVERY 6 HOURS
Refills: 0 | Status: DISCONTINUED | OUTPATIENT
Start: 2023-03-14 | End: 2023-03-14

## 2023-03-14 RX ORDER — CHLORHEXIDINE GLUCONATE 213 G/1000ML
15 SOLUTION TOPICAL EVERY 12 HOURS
Refills: 0 | Status: DISCONTINUED | OUTPATIENT
Start: 2023-03-14 | End: 2023-03-14

## 2023-03-14 RX ORDER — SODIUM CHLORIDE 9 MG/ML
500 INJECTION, SOLUTION INTRAVENOUS ONCE
Refills: 0 | Status: COMPLETED | OUTPATIENT
Start: 2023-03-14 | End: 2023-03-14

## 2023-03-14 RX ORDER — SENNA PLUS 8.6 MG/1
2 TABLET ORAL AT BEDTIME
Refills: 0 | Status: CANCELLED | OUTPATIENT
Start: 2023-03-15 | End: 2023-03-14

## 2023-03-14 RX ORDER — KETOROLAC TROMETHAMINE 30 MG/ML
30 SYRINGE (ML) INJECTION EVERY 8 HOURS
Refills: 0 | Status: DISCONTINUED | OUTPATIENT
Start: 2023-03-14 | End: 2023-03-16

## 2023-03-14 RX ORDER — SODIUM CHLORIDE 9 MG/ML
3 INJECTION INTRAMUSCULAR; INTRAVENOUS; SUBCUTANEOUS EVERY 8 HOURS
Refills: 0 | Status: DISCONTINUED | OUTPATIENT
Start: 2023-03-14 | End: 2023-03-14

## 2023-03-14 RX ORDER — GABAPENTIN 400 MG/1
100 CAPSULE ORAL EVERY 8 HOURS
Refills: 0 | Status: DISCONTINUED | OUTPATIENT
Start: 2023-03-14 | End: 2023-03-14

## 2023-03-14 RX ORDER — HYDROMORPHONE HYDROCHLORIDE 2 MG/ML
0.5 INJECTION INTRAMUSCULAR; INTRAVENOUS; SUBCUTANEOUS EVERY 6 HOURS
Refills: 0 | Status: DISCONTINUED | OUTPATIENT
Start: 2023-03-14 | End: 2023-03-14

## 2023-03-14 RX ORDER — DEXTROSE 50 % IN WATER 50 %
50 SYRINGE (ML) INTRAVENOUS
Refills: 0 | Status: DISCONTINUED | OUTPATIENT
Start: 2023-03-14 | End: 2023-03-22

## 2023-03-14 RX ORDER — OXYCODONE HYDROCHLORIDE 5 MG/1
5 TABLET ORAL EVERY 4 HOURS
Refills: 0 | Status: DISCONTINUED | OUTPATIENT
Start: 2023-03-14 | End: 2023-03-14

## 2023-03-14 RX ORDER — DEXTROSE 50 % IN WATER 50 %
25 SYRINGE (ML) INTRAVENOUS
Refills: 0 | Status: DISCONTINUED | OUTPATIENT
Start: 2023-03-14 | End: 2023-03-22

## 2023-03-14 RX ORDER — DOBUTAMINE HCL 250MG/20ML
1.25 VIAL (ML) INTRAVENOUS
Qty: 500 | Refills: 0 | Status: DISCONTINUED | OUTPATIENT
Start: 2023-03-14 | End: 2023-03-14

## 2023-03-14 RX ORDER — CHLORHEXIDINE GLUCONATE 213 G/1000ML
1 SOLUTION TOPICAL DAILY
Refills: 0 | Status: DISCONTINUED | OUTPATIENT
Start: 2023-03-14 | End: 2023-03-14

## 2023-03-14 RX ORDER — GABAPENTIN 400 MG/1
100 CAPSULE ORAL EVERY 8 HOURS
Refills: 0 | Status: COMPLETED | OUTPATIENT
Start: 2023-03-14 | End: 2023-03-19

## 2023-03-14 RX ORDER — SODIUM CHLORIDE 9 MG/ML
1000 INJECTION INTRAMUSCULAR; INTRAVENOUS; SUBCUTANEOUS
Refills: 0 | Status: DISCONTINUED | OUTPATIENT
Start: 2023-03-14 | End: 2023-03-22

## 2023-03-14 RX ORDER — POTASSIUM CHLORIDE 20 MEQ
10 PACKET (EA) ORAL
Refills: 0 | Status: COMPLETED | OUTPATIENT
Start: 2023-03-14 | End: 2023-03-14

## 2023-03-14 RX ORDER — HYDROMORPHONE HYDROCHLORIDE 2 MG/ML
0.5 INJECTION INTRAMUSCULAR; INTRAVENOUS; SUBCUTANEOUS
Refills: 0 | Status: DISCONTINUED | OUTPATIENT
Start: 2023-03-14 | End: 2023-03-17

## 2023-03-14 RX ORDER — NALOXONE HYDROCHLORIDE 4 MG/.1ML
0.1 SPRAY NASAL
Refills: 0 | Status: DISCONTINUED | OUTPATIENT
Start: 2023-03-14 | End: 2023-03-22

## 2023-03-14 RX ORDER — ONDANSETRON 8 MG/1
4 TABLET, FILM COATED ORAL ONCE
Refills: 0 | Status: COMPLETED | OUTPATIENT
Start: 2023-03-14 | End: 2023-03-14

## 2023-03-14 RX ORDER — OXYCODONE HYDROCHLORIDE 5 MG/1
10 TABLET ORAL EVERY 4 HOURS
Refills: 0 | Status: DISCONTINUED | OUTPATIENT
Start: 2023-03-14 | End: 2023-03-21

## 2023-03-14 RX ORDER — HYDROMORPHONE HYDROCHLORIDE 2 MG/ML
1 INJECTION INTRAMUSCULAR; INTRAVENOUS; SUBCUTANEOUS ONCE
Refills: 0 | Status: DISCONTINUED | OUTPATIENT
Start: 2023-03-14 | End: 2023-03-14

## 2023-03-14 RX ORDER — ACETAMINOPHEN 500 MG
1000 TABLET ORAL ONCE
Refills: 0 | Status: COMPLETED | OUTPATIENT
Start: 2023-03-14 | End: 2023-03-14

## 2023-03-14 RX ORDER — ACETAMINOPHEN 500 MG
650 TABLET ORAL EVERY 6 HOURS
Refills: 0 | Status: DISCONTINUED | OUTPATIENT
Start: 2023-03-17 | End: 2023-03-22

## 2023-03-14 RX ORDER — POLYETHYLENE GLYCOL 3350 17 G/17G
17 POWDER, FOR SOLUTION ORAL DAILY
Refills: 0 | Status: CANCELLED | OUTPATIENT
Start: 2023-03-15 | End: 2023-03-14

## 2023-03-14 RX ORDER — MAGNESIUM SULFATE 500 MG/ML
2 VIAL (ML) INJECTION ONCE
Refills: 0 | Status: COMPLETED | OUTPATIENT
Start: 2023-03-14 | End: 2023-03-14

## 2023-03-14 RX ORDER — PANTOPRAZOLE SODIUM 20 MG/1
40 TABLET, DELAYED RELEASE ORAL DAILY
Refills: 0 | Status: DISCONTINUED | OUTPATIENT
Start: 2023-03-14 | End: 2023-03-14

## 2023-03-14 RX ORDER — ONDANSETRON 8 MG/1
4 TABLET, FILM COATED ORAL EVERY 6 HOURS
Refills: 0 | Status: DISCONTINUED | OUTPATIENT
Start: 2023-03-14 | End: 2023-03-22

## 2023-03-14 RX ORDER — DEXTROSE 50 % IN WATER 50 %
25 SYRINGE (ML) INTRAVENOUS
Refills: 0 | Status: DISCONTINUED | OUTPATIENT
Start: 2023-03-14 | End: 2023-03-14

## 2023-03-14 RX ORDER — NICARDIPINE HYDROCHLORIDE 30 MG/1
2 CAPSULE, EXTENDED RELEASE ORAL
Qty: 40 | Refills: 0 | Status: DISCONTINUED | OUTPATIENT
Start: 2023-03-14 | End: 2023-03-16

## 2023-03-14 RX ADMIN — Medication 30 MILLIGRAM(S): at 20:20

## 2023-03-14 RX ADMIN — HYDROMORPHONE HYDROCHLORIDE 0.5 MILLIGRAM(S): 2 INJECTION INTRAMUSCULAR; INTRAVENOUS; SUBCUTANEOUS at 20:10

## 2023-03-14 RX ADMIN — DEXMEDETOMIDINE HYDROCHLORIDE IN 0.9% SODIUM CHLORIDE 29.4 MICROGRAM(S)/KG/HR: 4 INJECTION INTRAVENOUS at 14:27

## 2023-03-14 RX ADMIN — Medication 8.82 MICROGRAM(S)/KG/MIN: at 14:27

## 2023-03-14 RX ADMIN — CHLORHEXIDINE GLUCONATE 15 MILLILITER(S): 213 SOLUTION TOPICAL at 17:03

## 2023-03-14 RX ADMIN — HYDROMORPHONE HYDROCHLORIDE 0.5 MILLIGRAM(S): 2 INJECTION INTRAMUSCULAR; INTRAVENOUS; SUBCUTANEOUS at 18:55

## 2023-03-14 RX ADMIN — GABAPENTIN 100 MILLIGRAM(S): 400 CAPSULE ORAL at 21:48

## 2023-03-14 RX ADMIN — Medication 100 MILLIEQUIVALENT(S): at 16:47

## 2023-03-14 RX ADMIN — Medication 100 MILLIEQUIVALENT(S): at 18:03

## 2023-03-14 RX ADMIN — SODIUM CHLORIDE 3 MILLILITER(S): 9 INJECTION INTRAMUSCULAR; INTRAVENOUS; SUBCUTANEOUS at 07:48

## 2023-03-14 RX ADMIN — PANTOPRAZOLE SODIUM 40 MILLIGRAM(S): 20 TABLET, DELAYED RELEASE ORAL at 16:47

## 2023-03-14 RX ADMIN — Medication 25 GRAM(S): at 15:07

## 2023-03-14 RX ADMIN — HYDROMORPHONE HYDROCHLORIDE 30 MILLILITER(S): 2 INJECTION INTRAMUSCULAR; INTRAVENOUS; SUBCUTANEOUS at 22:24

## 2023-03-14 RX ADMIN — CHLORHEXIDINE GLUCONATE 1 APPLICATION(S): 213 SOLUTION TOPICAL at 20:53

## 2023-03-14 RX ADMIN — NICARDIPINE HYDROCHLORIDE 10 MG/HR: 30 CAPSULE, EXTENDED RELEASE ORAL at 14:35

## 2023-03-14 RX ADMIN — SODIUM CHLORIDE 10 MILLILITER(S): 9 INJECTION INTRAMUSCULAR; INTRAVENOUS; SUBCUTANEOUS at 14:57

## 2023-03-14 RX ADMIN — HYDROMORPHONE HYDROCHLORIDE 0.5 MILLIGRAM(S): 2 INJECTION INTRAMUSCULAR; INTRAVENOUS; SUBCUTANEOUS at 17:40

## 2023-03-14 RX ADMIN — Medication 650 MILLIGRAM(S): at 17:40

## 2023-03-14 RX ADMIN — SODIUM CHLORIDE 10 MILLILITER(S): 9 INJECTION INTRAMUSCULAR; INTRAVENOUS; SUBCUTANEOUS at 20:11

## 2023-03-14 RX ADMIN — HYDROMORPHONE HYDROCHLORIDE 1 MILLIGRAM(S): 2 INJECTION INTRAMUSCULAR; INTRAVENOUS; SUBCUTANEOUS at 14:30

## 2023-03-14 RX ADMIN — Medication 100 MILLIGRAM(S): at 14:56

## 2023-03-14 RX ADMIN — AMIODARONE HYDROCHLORIDE 400 MILLIGRAM(S): 400 TABLET ORAL at 17:53

## 2023-03-14 RX ADMIN — ONDANSETRON 4 MILLIGRAM(S): 8 TABLET, FILM COATED ORAL at 20:10

## 2023-03-14 RX ADMIN — Medication 30 MILLIGRAM(S): at 20:10

## 2023-03-14 RX ADMIN — HYDROMORPHONE HYDROCHLORIDE 1 MILLIGRAM(S): 2 INJECTION INTRAMUSCULAR; INTRAVENOUS; SUBCUTANEOUS at 14:45

## 2023-03-14 RX ADMIN — Medication 125 MILLILITER(S): at 23:07

## 2023-03-14 RX ADMIN — Medication 1000 MILLIGRAM(S): at 08:20

## 2023-03-14 RX ADMIN — NICARDIPINE HYDROCHLORIDE 10 MG/HR: 30 CAPSULE, EXTENDED RELEASE ORAL at 20:11

## 2023-03-14 RX ADMIN — Medication 500 MILLIGRAM(S): at 17:53

## 2023-03-14 RX ADMIN — SODIUM CHLORIDE 2000 MILLILITER(S): 9 INJECTION, SOLUTION INTRAVENOUS at 14:30

## 2023-03-14 RX ADMIN — Medication 10 MILLIGRAM(S): at 20:10

## 2023-03-14 RX ADMIN — HYDROMORPHONE HYDROCHLORIDE 0.5 MILLIGRAM(S): 2 INJECTION INTRAMUSCULAR; INTRAVENOUS; SUBCUTANEOUS at 20:20

## 2023-03-14 RX ADMIN — GABAPENTIN 300 MILLIGRAM(S): 400 CAPSULE ORAL at 08:20

## 2023-03-14 RX ADMIN — Medication 650 MILLIGRAM(S): at 17:53

## 2023-03-14 RX ADMIN — Medication 100 MILLIGRAM(S): at 21:56

## 2023-03-14 RX ADMIN — HYDROMORPHONE HYDROCHLORIDE 0.5 MILLIGRAM(S): 2 INJECTION INTRAMUSCULAR; INTRAVENOUS; SUBCUTANEOUS at 18:40

## 2023-03-14 RX ADMIN — HYDROMORPHONE HYDROCHLORIDE 0.5 MILLIGRAM(S): 2 INJECTION INTRAMUSCULAR; INTRAVENOUS; SUBCUTANEOUS at 17:23

## 2023-03-14 RX ADMIN — SODIUM CHLORIDE 2000 MILLILITER(S): 9 INJECTION, SOLUTION INTRAVENOUS at 21:48

## 2023-03-14 NOTE — BRIEF OPERATIVE NOTE - COMMENTS
I first assisted for the entirety of the case, including sternotomy, cardiopulmonary bypass, placement of Aortic Valve and closing.

## 2023-03-14 NOTE — PROGRESS NOTE ADULT - SUBJECTIVE AND OBJECTIVE BOX
Patient seen and examined at the bedside.    Remained critically ill on continuous ICU monitoring.      Brief Summary:  53 yo M with PMHx bicuspid AV, severe AI. Aortic insufficiency s/p mini sternotomy, AVR(t), aortic root enlargement on 3/14/23.       24 Hour events:  - Taken to the OR for mini sternotomy, AVR, aortic root enlargement   - Drips: Cardene   - Echo:   - Blood Products: 2 FFP, 10 Cryo, 1 plt       OBJECTIVE:  Vital Signs Last 24 Hrs  T(C): 36.7 (14 Mar 2023 16:00), Max: 36.8 (14 Mar 2023 07:44)  T(F): 98.1 (14 Mar 2023 16:00), Max: 98.2 (14 Mar 2023 07:44)  HR: 71 (14 Mar 2023 19:00) (58 - 81)  BP: 128/66 (14 Mar 2023 07:44) (128/66 - 128/66)  BP(mean): --  RR: 16 (14 Mar 2023 19:00) (9 - 22)  SpO2: 93% (14 Mar 2023 19:00) (90% - 100%)    Parameters below as of 14 Mar 2023 18:00  Patient On (Oxygen Delivery Method): nasal cannula  O2 Flow (L/min): 4  O2 Concentration (%): 36    Mode: CPAP with PS  FiO2: 50  PEEP: 5  PS: 5  ITime: 1  MAP: 8  PIP: 18              Physical Exam:   General: NAD   Neurology: nonfocal   Eyes: bilateral pupils equal and reactive   ENT/Neck: Neck supple, trachea midline,  Respiratory: on nasal cannula   CV: S1S2, no murmurs        [x] Sternal dressing, [x] Mediastinal CT, [x] Pleural CT, [x] TD drain        [x] Sinus rhythm, [x] Afib, [x] Temporary pacing, [x] PPM  Abdominal: Soft, NT, ND +BS   Extremities: 1-2+ pedal edema noted, + peripheral pulses   Skin: No Rashes, Hematoma, Ecchymosis         -------------------------------------------------------------------------------------------------------------------------------    Labs:                        11.3   10.12 )-----------( 113      ( 14 Mar 2023 14:39 )             31.5     03-14    144  |  109<H>  |  17  ----------------------------<  133<H>  5.0   |  22  |  0.92    Ca    9.7      14 Mar 2023 14:39  Phos  3.7     03-14  Mg     2.6     03-14    TPro  5.9<L>  /  Alb  4.2  /  TBili  1.0  /  DBili  x   /  AST  37  /  ALT  24  /  AlkPhos  44  03-14    LIVER FUNCTIONS - ( 14 Mar 2023 14:39 )  Alb: 4.2 g/dL / Pro: 5.9 g/dL / ALK PHOS: 44 U/L / ALT: 24 U/L / AST: 37 U/L / GGT: x           PT/INR - ( 14 Mar 2023 14:38 )   PT: 13.1 sec;   INR: 1.14 ratio         PTT - ( 14 Mar 2023 14:38 )  PTT:28.9 sec  ABG - ( 14 Mar 2023 17:09 )  pH, Arterial: 7.38  pH, Blood: x     /  pCO2: 42    /  pO2: 103   / HCO3: 25    / Base Excess: -0.4  /  SaO2: 98.5    ------------------------------------------------------------------------------------------------------------------------------  Assessment:  53 yo M with PMHx bicuspid AV, severe AI. Aortic insufficiency s/p mini sternotomy, AVR(t), aortic root enlargement on 3/14/23.     Hemodynamic instability   Hypovolemia   Stress hyperglycemia   Thrombocytopenia  Acute blood loss anemia    Plan:   ***Neuro***  - Postoperative acute pain control with prns.     ***Cardiovascular***  - Invasive hemodynamic monitoring, assess perfusion indices   - At high risk for hemodynamic instability and cardiac arrhythmias.  - IVF for perioperative hypovolemia.  - BP management with Cardene infusion   - Amiodarone for afib prophylaxis   - Monitor chest tube output.    ***Pulmonary***  - Postoperative acute respiratory insufficiency  - Deep breathing and coughing exercises.  - Wean oxygen as able.    ***GI***  - Tolerating a liquid diet   - Protonix for stress ulcer prophylaxis   - Reglan for gut motility     ***Renal***  - Brasher catheter for strict I/O measurements.   - Replete electrolytes as indicated.    ***ID***  - Perioperative coverage with Cefuroxime.    ***Endocrine***  - Stress Hyperglycemia  - Insulin as needed to maintain euglycemia.     ***Hematology***  - Acute blood loss anemia and thrombocytopenia - no current transfusion indication.        Patient requires continuous monitoring with bedside rhythm monitoring, pulse oximetry monitoring, and continuous central venous and arterial pressure monitoring; and intermittent blood gas analysis. Care plan discussed with the ICU care team.   Patient remained critical, at risk for life threatening decompensation.    I have spent 50 minutes providing critical care management to this patient.    By signing my name below, I, Jennifer Chavira, attest that this documentation has been prepared under the direction and in the presence of Jody Michaels DO  Electronically signed: Jennifer Cait, 03-14-23 @ 20:26    I, Jody Michaels DO, personally performed the services described in this documentation. all medical record entries made by the scribe were at my direction and in my presence. I have reviewed the chart and agree that the record reflects my personal performance and is accurate and complete  Electronically signed: Jody Michaels DO, Patient seen and examined at the bedside.    Remained critically ill on continuous ICU monitoring.      Brief Summary:  53 yo M with PMHx bicuspid AV, severe AI, now s/p mini sternotomy, AVR-t, aortic root enlargement on 3/14/23.       24 Hour events:  - Taken to the OR for mini sternotomy, AVR-t, aortic root enlargement on 3/14/23  - Drips: Dobutamine, cardene  - Echo: Mildly reduced LV function, normal RV function  - Blood Products: 2 FFP, 10 Cryo, 1 plt     - In CTU:  - Extubated to NC.  - Dobutamine weaned off.    OBJECTIVE:  Vital Signs Last 24 Hrs  T(C): 36.7 (14 Mar 2023 16:00), Max: 36.8 (14 Mar 2023 07:44)  T(F): 98.1 (14 Mar 2023 16:00), Max: 98.2 (14 Mar 2023 07:44)  HR: 71 (14 Mar 2023 19:00) (58 - 81)  BP: 128/66 (14 Mar 2023 07:44) (128/66 - 128/66)  BP(mean): --  RR: 16 (14 Mar 2023 19:00) (9 - 22)  SpO2: 93% (14 Mar 2023 19:00) (90% - 100%)    Parameters below as of 14 Mar 2023 18:00  Patient On (Oxygen Delivery Method): nasal cannula  O2 Flow (L/min): 4              Physical Exam:   General: AAOx3  Neurology: intact  Eyes: bilateral pupils equal and reactive   ENT/Neck: Neck supple, trachea midline  Respiratory: on nasal cannula   CV: NSR, no murmurs        [x] Sternal dressing, [x] Mediastinal CT, [x] Pleural CT, [x] TD drain  Abdominal: Soft, NT  Extremities: 1-2+ pedal edema noted, + peripheral pulses, warm to palpation  Skin: No Rashes, Hematoma, Ecchymosis         -------------------------------------------------------------------------------------------------------------------------------    Labs:                        11.3   10.12 )-----------( 113      ( 14 Mar 2023 14:39 )             31.5     03-14    144  |  109<H>  |  17  ----------------------------<  133<H>  5.0   |  22  |  0.92    Ca    9.7      14 Mar 2023 14:39  Phos  3.7     03-14  Mg     2.6     03-14    TPro  5.9<L>  /  Alb  4.2  /  TBili  1.0  /  DBili  x   /  AST  37  /  ALT  24  /  AlkPhos  44  03-14    LIVER FUNCTIONS - ( 14 Mar 2023 14:39 )  Alb: 4.2 g/dL / Pro: 5.9 g/dL / ALK PHOS: 44 U/L / ALT: 24 U/L / AST: 37 U/L / GGT: x           PT/INR - ( 14 Mar 2023 14:38 )   PT: 13.1 sec;   INR: 1.14 ratio         PTT - ( 14 Mar 2023 14:38 )  PTT:28.9 sec  ABG - ( 14 Mar 2023 17:09 )  pH, Arterial: 7.38  pH, Blood: x     /  pCO2: 42    /  pO2: 103   / HCO3: 25    / Base Excess: -0.4  /  SaO2: 98.5    ------------------------------------------------------------------------------------------------------------------------------  Assessment:  53 yo M with PMHx bicuspid AV, severe AI, now s/p mini sternotomy, AVR-t, aortic root enlargement on 3/14/23.     s/p mini sternotomy, AVR-t, aortic root enlargement on 3/14/23  Post-operative pain  At risk for hemodynamic instability and cardiac arrhythmias  Acute post-operative respiratory insufficiency  Hypovolemia   Stress hyperglycemia   Thrombocytopenia  Acute blood loss anemia    Plan:   ***Neuro***  - Postoperative acute pain control with tylenol, gabapentin, dilaudid PCA.  - Appreciate pain management recs.    ***Cardiovascular***  - Invasive hemodynamic monitoring, assess perfusion indices   - At risk for hemodynamic instability and cardiac arrhythmias.  - IVF for perioperative hypovolemia.  - Keep -120.  Cardene for HTN.  - Weaned off dobutamine.  - Amiodarone for afib prophylaxis   - Monitor chest tube output.    ***Pulmonary***  - Postoperative acute respiratory insufficiency  - Deep breathing and coughing exercises.  - Wean FiO2 to keep SpO2 > 92%.  - IS.    ***GI***  - Continue diet.  - Protonix for stress ulcer prophylaxis   - Reglan for gut motility     ***Renal***  - Brasher catheter for strict I/O measurements.   - Replete electrolytes as indicated.    ***ID***  - Perioperative coverage with Cefuroxime.    ***Endocrine***  - Stress Hyperglycemia  - Insulin as needed to maintain euglycemia.     ***Hematology***  - Acute blood loss anemia and thrombocytopenia - no current transfusion indication.        Patient requires continuous monitoring with bedside rhythm monitoring, pulse oximetry monitoring, and continuous central venous and arterial pressure monitoring; and intermittent blood gas analysis. Care plan discussed with the ICU care team.   Patient remained critical, at risk for life threatening decompensation.    I have spent 50 minutes providing critical care management to this patient.    By signing my name below, I, Jennifer Chavira, attest that this documentation has been prepared under the direction and in the presence of Jody Michaels DO  Electronically signed: Jennifer Chavira, 03-14-23 @ 20:26    I, Jody Michaels DO, personally performed the services described in this documentation. all medical record entries made by the scribe were at my direction and in my presence. I have reviewed the chart and agree that the record reflects my personal performance and is accurate and complete  Electronically signed: Jody Michaels DO,

## 2023-03-14 NOTE — AIRWAY REMOVAL NOTE  ADULT & PEDS - ARTIFICAL AIRWAY REMOVAL COMMENTS
Written order for extubation verified. The patient was identified by full name and birth date compared to the identification band. Present during the procedure was LACEY MARQUEZ

## 2023-03-14 NOTE — PATIENT PROFILE ADULT - FALL HARM RISK - UNIVERSAL INTERVENTIONS
Bed in lowest position, wheels locked, appropriate side rails in place/Call bell, personal items and telephone in reach/Instruct patient to call for assistance before getting out of bed or chair/Non-slip footwear when patient is out of bed/[place] to call system/Physically safe environment - no spills, clutter or unnecessary equipment/Purposeful Proactive Rounding/Room/bathroom lighting operational, light cord in reach

## 2023-03-14 NOTE — BRIEF OPERATIVE NOTE - NSICDXBRIEFPROCEDURE_GEN_ALL_CORE_FT
PROCEDURES:  Aortic valve replacement, tissue 14-Mar-2023 14:15:22 #27 Inspirs Resilia valve with aortic root enlargement Samanta Monique

## 2023-03-15 LAB
ALBUMIN SERPL ELPH-MCNC: 4.6 G/DL — SIGNIFICANT CHANGE UP (ref 3.3–5)
ALP SERPL-CCNC: 33 U/L — LOW (ref 40–120)
ALT FLD-CCNC: 22 U/L — SIGNIFICANT CHANGE UP (ref 10–45)
ANION GAP SERPL CALC-SCNC: 13 MMOL/L — SIGNIFICANT CHANGE UP (ref 5–17)
APTT BLD: 25.2 SEC — LOW (ref 27.5–35.5)
AST SERPL-CCNC: 42 U/L — HIGH (ref 10–40)
BASE EXCESS BLDV CALC-SCNC: -0.4 MMOL/L — SIGNIFICANT CHANGE UP (ref -2–3)
BASE EXCESS BLDV CALC-SCNC: 0.8 MMOL/L — SIGNIFICANT CHANGE UP (ref -2–3)
BILIRUB SERPL-MCNC: 1 MG/DL — SIGNIFICANT CHANGE UP (ref 0.2–1.2)
BLOOD GAS VENOUS - CREATININE: SIGNIFICANT CHANGE UP MG/DL (ref 0.5–1.3)
BUN SERPL-MCNC: 14 MG/DL — SIGNIFICANT CHANGE UP (ref 7–23)
CA-I SERPL-SCNC: 1.21 MMOL/L — SIGNIFICANT CHANGE UP (ref 1.15–1.33)
CALCIUM SERPL-MCNC: 8.6 MG/DL — SIGNIFICANT CHANGE UP (ref 8.4–10.5)
CHLORIDE BLDV-SCNC: 101 MMOL/L — SIGNIFICANT CHANGE UP (ref 96–108)
CHLORIDE SERPL-SCNC: 104 MMOL/L — SIGNIFICANT CHANGE UP (ref 96–108)
CO2 BLDV-SCNC: 26 MMOL/L — SIGNIFICANT CHANGE UP (ref 22–26)
CO2 BLDV-SCNC: 28 MMOL/L — HIGH (ref 22–26)
CO2 SERPL-SCNC: 23 MMOL/L — SIGNIFICANT CHANGE UP (ref 22–31)
CREAT SERPL-MCNC: 0.8 MG/DL — SIGNIFICANT CHANGE UP (ref 0.5–1.3)
EGFR: 106 ML/MIN/1.73M2 — SIGNIFICANT CHANGE UP
FIBRINOGEN PPP-MCNC: 303 MG/DL — SIGNIFICANT CHANGE UP (ref 200–445)
GAS PNL BLDA: SIGNIFICANT CHANGE UP
GAS PNL BLDV: 134 MMOL/L — LOW (ref 136–145)
GAS PNL BLDV: SIGNIFICANT CHANGE UP
GAS PNL BLDV: SIGNIFICANT CHANGE UP
GLUCOSE BLDC GLUCOMTR-MCNC: 108 MG/DL — HIGH (ref 70–99)
GLUCOSE BLDC GLUCOMTR-MCNC: 129 MG/DL — HIGH (ref 70–99)
GLUCOSE BLDC GLUCOMTR-MCNC: 134 MG/DL — HIGH (ref 70–99)
GLUCOSE BLDC GLUCOMTR-MCNC: 135 MG/DL — HIGH (ref 70–99)
GLUCOSE BLDC GLUCOMTR-MCNC: 135 MG/DL — HIGH (ref 70–99)
GLUCOSE BLDC GLUCOMTR-MCNC: 138 MG/DL — HIGH (ref 70–99)
GLUCOSE BLDC GLUCOMTR-MCNC: 140 MG/DL — HIGH (ref 70–99)
GLUCOSE BLDC GLUCOMTR-MCNC: 140 MG/DL — HIGH (ref 70–99)
GLUCOSE BLDC GLUCOMTR-MCNC: 142 MG/DL — HIGH (ref 70–99)
GLUCOSE BLDC GLUCOMTR-MCNC: 144 MG/DL — HIGH (ref 70–99)
GLUCOSE BLDC GLUCOMTR-MCNC: 144 MG/DL — HIGH (ref 70–99)
GLUCOSE BLDC GLUCOMTR-MCNC: 145 MG/DL — HIGH (ref 70–99)
GLUCOSE BLDC GLUCOMTR-MCNC: 145 MG/DL — HIGH (ref 70–99)
GLUCOSE BLDC GLUCOMTR-MCNC: 151 MG/DL — HIGH (ref 70–99)
GLUCOSE BLDC GLUCOMTR-MCNC: 151 MG/DL — HIGH (ref 70–99)
GLUCOSE BLDC GLUCOMTR-MCNC: 153 MG/DL — HIGH (ref 70–99)
GLUCOSE BLDC GLUCOMTR-MCNC: 177 MG/DL — HIGH (ref 70–99)
GLUCOSE BLDC GLUCOMTR-MCNC: 181 MG/DL — HIGH (ref 70–99)
GLUCOSE BLDV-MCNC: 165 MG/DL — HIGH (ref 70–99)
GLUCOSE SERPL-MCNC: 144 MG/DL — HIGH (ref 70–99)
HCO3 BLDV-SCNC: 25 MMOL/L — SIGNIFICANT CHANGE UP (ref 22–29)
HCO3 BLDV-SCNC: 27 MMOL/L — SIGNIFICANT CHANGE UP (ref 22–29)
HCT VFR BLD CALC: 30.2 % — LOW (ref 39–50)
HCT VFR BLDA CALC: 32 % — LOW (ref 39–51)
HGB BLD CALC-MCNC: 10.6 G/DL — LOW (ref 12.6–17.4)
HGB BLD-MCNC: 10.3 G/DL — LOW (ref 13–17)
HOROWITZ INDEX BLDV+IHG-RTO: 40 — SIGNIFICANT CHANGE UP
INR BLD: 1.15 RATIO — SIGNIFICANT CHANGE UP (ref 0.88–1.16)
LACTATE BLDV-MCNC: 3.4 MMOL/L — HIGH (ref 0.5–2)
MAGNESIUM SERPL-MCNC: 2.1 MG/DL — SIGNIFICANT CHANGE UP (ref 1.6–2.6)
MCHC RBC-ENTMCNC: 30.4 PG — SIGNIFICANT CHANGE UP (ref 27–34)
MCHC RBC-ENTMCNC: 34.1 GM/DL — SIGNIFICANT CHANGE UP (ref 32–36)
MCV RBC AUTO: 89.1 FL — SIGNIFICANT CHANGE UP (ref 80–100)
NRBC # BLD: 0 /100 WBCS — SIGNIFICANT CHANGE UP (ref 0–0)
PCO2 BLDV: 42 MMHG — SIGNIFICANT CHANGE UP (ref 42–55)
PCO2 BLDV: 46 MMHG — SIGNIFICANT CHANGE UP (ref 42–55)
PH BLDV: 7.37 — SIGNIFICANT CHANGE UP (ref 7.32–7.43)
PH BLDV: 7.38 — SIGNIFICANT CHANGE UP (ref 7.32–7.43)
PHOSPHATE SERPL-MCNC: 2.7 MG/DL — SIGNIFICANT CHANGE UP (ref 2.5–4.5)
PLATELET # BLD AUTO: 130 K/UL — LOW (ref 150–400)
PO2 BLDV: 38 MMHG — SIGNIFICANT CHANGE UP (ref 25–45)
PO2 BLDV: 41 MMHG — SIGNIFICANT CHANGE UP (ref 25–45)
POTASSIUM BLDV-SCNC: 3.8 MMOL/L — SIGNIFICANT CHANGE UP (ref 3.5–5.1)
POTASSIUM SERPL-MCNC: 4 MMOL/L — SIGNIFICANT CHANGE UP (ref 3.5–5.3)
POTASSIUM SERPL-SCNC: 4 MMOL/L — SIGNIFICANT CHANGE UP (ref 3.5–5.3)
PROT SERPL-MCNC: 6.2 G/DL — SIGNIFICANT CHANGE UP (ref 6–8.3)
PROTHROM AB SERPL-ACNC: 13.2 SEC — SIGNIFICANT CHANGE UP (ref 10.5–13.4)
RBC # BLD: 3.39 M/UL — LOW (ref 4.2–5.8)
RBC # FLD: 12.4 % — SIGNIFICANT CHANGE UP (ref 10.3–14.5)
SAO2 % BLDV: 67.9 % — SIGNIFICANT CHANGE UP (ref 67–88)
SAO2 % BLDV: 72.3 % — SIGNIFICANT CHANGE UP (ref 67–88)
SODIUM SERPL-SCNC: 140 MMOL/L — SIGNIFICANT CHANGE UP (ref 135–145)
WBC # BLD: 11.04 K/UL — HIGH (ref 3.8–10.5)
WBC # FLD AUTO: 11.04 K/UL — HIGH (ref 3.8–10.5)

## 2023-03-15 PROCEDURE — 99291 CRITICAL CARE FIRST HOUR: CPT

## 2023-03-15 PROCEDURE — 71045 X-RAY EXAM CHEST 1 VIEW: CPT | Mod: 26

## 2023-03-15 RX ORDER — METOPROLOL TARTRATE 50 MG
25 TABLET ORAL EVERY 8 HOURS
Refills: 0 | Status: DISCONTINUED | OUTPATIENT
Start: 2023-03-15 | End: 2023-03-15

## 2023-03-15 RX ORDER — SODIUM CHLORIDE 9 MG/ML
1000 INJECTION, SOLUTION INTRAVENOUS
Refills: 0 | Status: DISCONTINUED | OUTPATIENT
Start: 2023-03-15 | End: 2023-03-18

## 2023-03-15 RX ORDER — DEXTROSE 50 % IN WATER 50 %
15 SYRINGE (ML) INTRAVENOUS ONCE
Refills: 0 | Status: DISCONTINUED | OUTPATIENT
Start: 2023-03-15 | End: 2023-03-22

## 2023-03-15 RX ORDER — POTASSIUM CHLORIDE 20 MEQ
10 PACKET (EA) ORAL
Refills: 0 | Status: COMPLETED | OUTPATIENT
Start: 2023-03-15 | End: 2023-03-15

## 2023-03-15 RX ORDER — SODIUM CHLORIDE 9 MG/ML
500 INJECTION, SOLUTION INTRAVENOUS ONCE
Refills: 0 | Status: COMPLETED | OUTPATIENT
Start: 2023-03-15 | End: 2023-03-15

## 2023-03-15 RX ORDER — PANTOPRAZOLE SODIUM 20 MG/1
40 TABLET, DELAYED RELEASE ORAL
Refills: 0 | Status: DISCONTINUED | OUTPATIENT
Start: 2023-03-15 | End: 2023-03-22

## 2023-03-15 RX ORDER — ENOXAPARIN SODIUM 100 MG/ML
40 INJECTION SUBCUTANEOUS EVERY 24 HOURS
Refills: 0 | Status: DISCONTINUED | OUTPATIENT
Start: 2023-03-15 | End: 2023-03-17

## 2023-03-15 RX ORDER — ASPIRIN/CALCIUM CARB/MAGNESIUM 324 MG
81 TABLET ORAL DAILY
Refills: 0 | Status: DISCONTINUED | OUTPATIENT
Start: 2023-03-15 | End: 2023-03-22

## 2023-03-15 RX ORDER — INSULIN LISPRO 100/ML
VIAL (ML) SUBCUTANEOUS AT BEDTIME
Refills: 0 | Status: DISCONTINUED | OUTPATIENT
Start: 2023-03-15 | End: 2023-03-18

## 2023-03-15 RX ORDER — MAGNESIUM SULFATE 500 MG/ML
1 VIAL (ML) INJECTION ONCE
Refills: 0 | Status: COMPLETED | OUTPATIENT
Start: 2023-03-15 | End: 2023-03-15

## 2023-03-15 RX ORDER — METOPROLOL TARTRATE 50 MG
25 TABLET ORAL ONCE
Refills: 0 | Status: COMPLETED | OUTPATIENT
Start: 2023-03-15 | End: 2023-03-15

## 2023-03-15 RX ORDER — LIDOCAINE 4 G/100G
1 CREAM TOPICAL DAILY
Refills: 0 | Status: DISCONTINUED | OUTPATIENT
Start: 2023-03-15 | End: 2023-03-22

## 2023-03-15 RX ORDER — INSULIN LISPRO 100/ML
VIAL (ML) SUBCUTANEOUS
Refills: 0 | Status: DISCONTINUED | OUTPATIENT
Start: 2023-03-15 | End: 2023-03-18

## 2023-03-15 RX ORDER — HYDRALAZINE HCL 50 MG
10 TABLET ORAL ONCE
Refills: 0 | Status: COMPLETED | OUTPATIENT
Start: 2023-03-15 | End: 2023-03-15

## 2023-03-15 RX ORDER — ALBUMIN HUMAN 25 %
250 VIAL (ML) INTRAVENOUS ONCE
Refills: 0 | Status: COMPLETED | OUTPATIENT
Start: 2023-03-15 | End: 2023-03-15

## 2023-03-15 RX ORDER — SODIUM CHLORIDE 9 MG/ML
1000 INJECTION INTRAMUSCULAR; INTRAVENOUS; SUBCUTANEOUS
Refills: 0 | Status: DISCONTINUED | OUTPATIENT
Start: 2023-03-15 | End: 2023-03-22

## 2023-03-15 RX ORDER — GLUCAGON INJECTION, SOLUTION 0.5 MG/.1ML
1 INJECTION, SOLUTION SUBCUTANEOUS ONCE
Refills: 0 | Status: DISCONTINUED | OUTPATIENT
Start: 2023-03-15 | End: 2023-03-18

## 2023-03-15 RX ORDER — HYDRALAZINE HCL 50 MG
5 TABLET ORAL ONCE
Refills: 0 | Status: COMPLETED | OUTPATIENT
Start: 2023-03-15 | End: 2023-03-15

## 2023-03-15 RX ORDER — METOPROLOL TARTRATE 50 MG
50 TABLET ORAL EVERY 8 HOURS
Refills: 0 | Status: DISCONTINUED | OUTPATIENT
Start: 2023-03-15 | End: 2023-03-16

## 2023-03-15 RX ADMIN — Medication 30 MILLIGRAM(S): at 06:17

## 2023-03-15 RX ADMIN — ENOXAPARIN SODIUM 40 MILLIGRAM(S): 100 INJECTION SUBCUTANEOUS at 09:54

## 2023-03-15 RX ADMIN — GABAPENTIN 100 MILLIGRAM(S): 400 CAPSULE ORAL at 13:28

## 2023-03-15 RX ADMIN — Medication 25 MILLIGRAM(S): at 09:53

## 2023-03-15 RX ADMIN — Medication 100 MILLIEQUIVALENT(S): at 04:47

## 2023-03-15 RX ADMIN — GABAPENTIN 100 MILLIGRAM(S): 400 CAPSULE ORAL at 06:17

## 2023-03-15 RX ADMIN — Medication 10 MILLIGRAM(S): at 21:06

## 2023-03-15 RX ADMIN — Medication 81 MILLIGRAM(S): at 13:29

## 2023-03-15 RX ADMIN — Medication 100 MILLIEQUIVALENT(S): at 03:42

## 2023-03-15 RX ADMIN — Medication 100 MILLIEQUIVALENT(S): at 04:19

## 2023-03-15 RX ADMIN — Medication 125 MILLILITER(S): at 00:53

## 2023-03-15 RX ADMIN — CHLORHEXIDINE GLUCONATE 1 APPLICATION(S): 213 SOLUTION TOPICAL at 22:06

## 2023-03-15 RX ADMIN — Medication 650 MILLIGRAM(S): at 17:06

## 2023-03-15 RX ADMIN — HYDROMORPHONE HYDROCHLORIDE 30 MILLILITER(S): 2 INJECTION INTRAMUSCULAR; INTRAVENOUS; SUBCUTANEOUS at 07:07

## 2023-03-15 RX ADMIN — AMIODARONE HYDROCHLORIDE 400 MILLIGRAM(S): 400 TABLET ORAL at 06:16

## 2023-03-15 RX ADMIN — Medication 650 MILLIGRAM(S): at 11:26

## 2023-03-15 RX ADMIN — SENNA PLUS 2 TABLET(S): 8.6 TABLET ORAL at 21:07

## 2023-03-15 RX ADMIN — Medication 650 MILLIGRAM(S): at 06:17

## 2023-03-15 RX ADMIN — Medication 100 MILLIEQUIVALENT(S): at 12:06

## 2023-03-15 RX ADMIN — Medication 100 MILLIGRAM(S): at 13:30

## 2023-03-15 RX ADMIN — Medication 10 MILLIGRAM(S): at 13:28

## 2023-03-15 RX ADMIN — Medication 30 MILLIGRAM(S): at 21:21

## 2023-03-15 RX ADMIN — NICARDIPINE HYDROCHLORIDE 10 MG/HR: 30 CAPSULE, EXTENDED RELEASE ORAL at 16:24

## 2023-03-15 RX ADMIN — GABAPENTIN 100 MILLIGRAM(S): 400 CAPSULE ORAL at 21:07

## 2023-03-15 RX ADMIN — Medication 100 MILLIEQUIVALENT(S): at 12:42

## 2023-03-15 RX ADMIN — POLYETHYLENE GLYCOL 3350 17 GRAM(S): 17 POWDER, FOR SOLUTION ORAL at 11:26

## 2023-03-15 RX ADMIN — Medication 500 MILLIGRAM(S): at 17:06

## 2023-03-15 RX ADMIN — Medication 25 MILLIGRAM(S): at 11:26

## 2023-03-15 RX ADMIN — Medication 100 MILLIEQUIVALENT(S): at 18:28

## 2023-03-15 RX ADMIN — PANTOPRAZOLE SODIUM 40 MILLIGRAM(S): 20 TABLET, DELAYED RELEASE ORAL at 16:21

## 2023-03-15 RX ADMIN — AMIODARONE HYDROCHLORIDE 400 MILLIGRAM(S): 400 TABLET ORAL at 17:06

## 2023-03-15 RX ADMIN — NICARDIPINE HYDROCHLORIDE 10 MG/HR: 30 CAPSULE, EXTENDED RELEASE ORAL at 19:49

## 2023-03-15 RX ADMIN — Medication 50 MILLIGRAM(S): at 16:21

## 2023-03-15 RX ADMIN — Medication 30 MILLIGRAM(S): at 21:06

## 2023-03-15 RX ADMIN — Medication 125 MILLILITER(S): at 09:57

## 2023-03-15 RX ADMIN — Medication 100 MILLIEQUIVALENT(S): at 11:12

## 2023-03-15 RX ADMIN — Medication 100 GRAM(S): at 09:53

## 2023-03-15 RX ADMIN — Medication 10 MILLIGRAM(S): at 06:17

## 2023-03-15 RX ADMIN — Medication 650 MILLIGRAM(S): at 01:02

## 2023-03-15 RX ADMIN — Medication 100 MILLIGRAM(S): at 06:17

## 2023-03-15 RX ADMIN — Medication 100 MILLIEQUIVALENT(S): at 19:48

## 2023-03-15 RX ADMIN — Medication 100 MILLIEQUIVALENT(S): at 16:22

## 2023-03-15 RX ADMIN — Medication 10 MILLIGRAM(S): at 13:25

## 2023-03-15 RX ADMIN — Medication 30 MILLIGRAM(S): at 13:27

## 2023-03-15 RX ADMIN — Medication 100 MILLIGRAM(S): at 21:06

## 2023-03-15 RX ADMIN — Medication 5 MILLIGRAM(S): at 08:25

## 2023-03-15 RX ADMIN — Medication 650 MILLIGRAM(S): at 06:16

## 2023-03-15 RX ADMIN — Medication 650 MILLIGRAM(S): at 00:52

## 2023-03-15 RX ADMIN — Medication 10 MILLIGRAM(S): at 17:47

## 2023-03-15 RX ADMIN — HYDROMORPHONE HYDROCHLORIDE 30 MILLILITER(S): 2 INJECTION INTRAMUSCULAR; INTRAVENOUS; SUBCUTANEOUS at 18:59

## 2023-03-15 RX ADMIN — INSULIN HUMAN 3 UNIT(S)/HR: 100 INJECTION, SOLUTION SUBCUTANEOUS at 11:27

## 2023-03-15 RX ADMIN — Medication 500 MILLIGRAM(S): at 06:16

## 2023-03-15 RX ADMIN — Medication 100 MILLIEQUIVALENT(S): at 17:05

## 2023-03-15 RX ADMIN — SODIUM CHLORIDE 3000 MILLILITER(S): 9 INJECTION, SOLUTION INTRAVENOUS at 00:53

## 2023-03-15 RX ADMIN — NICARDIPINE HYDROCHLORIDE 10 MG/HR: 30 CAPSULE, EXTENDED RELEASE ORAL at 11:12

## 2023-03-15 NOTE — PHYSICAL THERAPY INITIAL EVALUATION ADULT - PERTINENT HX OF CURRENT PROBLEM, REHAB EVAL
53 yo M with PMHx bicuspid AV, severe AI, now s/p mini sternotomy, AVR-t, aortic root enlargement on 3/14/23. 51 yo M with PMHx bicuspid AV, severe AI, now s/p mini sternotomy, AVR-t, aortic root enlargement on 3/14/23. no other pending tests

## 2023-03-15 NOTE — PHYSICAL THERAPY INITIAL EVALUATION ADULT - GENERAL OBSERVATIONS, REHAB EVAL
semi-supine in bed with NAD, +Cardiac monitor, +HFNC, +A-line, +IV, +pulse ox, +banks, +external pacer, +chest tube x3

## 2023-03-15 NOTE — PHYSICAL THERAPY INITIAL EVALUATION ADULT - LEVEL OF CONSCIOUSNESS, REHAB EVAL
Problem: Communication  Goal: The ability to communicate needs accurately and effectively will improve  Outcome: PROGRESSING AS EXPECTED  Pt is communicating all needs appropriately.      Problem: Safety  Goal: Will remain free from injury  Outcome: PROGRESSING AS EXPECTED  Pt calls for assistance OOB, call light in reach, non skid socks on.      alert

## 2023-03-15 NOTE — PHARMACOTHERAPY INTERVENTION NOTE - COMMENTS
Brandan Witt     52M   3/14 mini sternotomy, AVR (t), aortic root enlargment     LFTs wnl, platelets ok for now, will monitor  Discussing ASA initiation with team  On toradol to alleviate pain and minimize opioids - appropriate; tramadol is additional alternative  Rec adv bowel regimen with dilaudid PCA  Titrate off nicard gtt as appropriate

## 2023-03-15 NOTE — PHYSICAL THERAPY INITIAL EVALUATION ADULT - ADDITIONAL COMMENTS
as per pt, resides in a PH with spouse, +2 stairs to enter, one floor set-up, PTA, pt amb (I), (I) with ADLs.

## 2023-03-15 NOTE — PROGRESS NOTE ADULT - SUBJECTIVE AND OBJECTIVE BOX
[address]            (For Outpatient Use Only) Initial Admit Date: 6/27/2018   Inpt/Obs Admit Date: Inpt: 6/27/18 / Obs: N/A   Discharge Date:    Kolsonia Captain:  [de-identified]   MRN: [de-identified]   CSN: [id number]        ENCOUNTER  Patient Patient seen and examined at the bedside.    Remained critically ill on continuous ICU monitoring.      Brief Summary:  53 yo M with PMHx bicuspid AV, severe AI, now s/p mini sternotomy, AVR-t, aortic root enlargement on 3/14/23.       24 Hour events:      OBJECTIVE:  Vital Signs Last 24 Hrs  T(C): 37.6 (15 Mar 2023 07:00), Max: 37.8 (15 Mar 2023 04:00)  T(F): 99.7 (15 Mar 2023 07:00), Max: 100 (15 Mar 2023 04:00)  HR: 79 (15 Mar 2023 07:30) (63 - 96)  BP: --  BP(mean): --  RR: 17 (15 Mar 2023 07:30) (9 - 36)  SpO2: 92% (15 Mar 2023 07:30) (90% - 100%)    Parameters below as of 15 Mar 2023 07:00  Patient On (Oxygen Delivery Method): nasal cannula, high flow  O2 Flow (L/min): 50  O2 Concentration (%): 70    Mode: CPAP with PS  FiO2: 50  PEEP: 5  PS: 5  ITime: 1  MAP: 8  PIP: 18              Physical Exam:   General: AAOx3  Neurology: intact  Eyes: bilateral pupils equal and reactive   ENT/Neck: Neck supple, trachea midline  Respiratory: on nasal cannula   CV: NSR, no murmurs        [x] Sternal dressing, [x] Mediastinal CT, [x] Pleural CT, [x] TD drain  Abdominal: Soft, NT  Extremities: 1-2+ pedal edema noted, + peripheral pulses, warm to palpation  Skin: No Rashes, Hematoma, Ecchymosis    -------------------------------------------------------------------------------------------------------------------------------  Labs:                        10.3   11.04 )-----------( 130      ( 15 Mar 2023 00:27 )             30.2     03-15    140  |  104  |  14  ----------------------------<  144<H>  4.0   |  23  |  0.80    Ca    8.6      15 Mar 2023 00:27  Phos  2.7     03-15  Mg     2.1     03-15    TPro  6.2  /  Alb  4.6  /  TBili  1.0  /  DBili  x   /  AST  42<H>  /  ALT  22  /  AlkPhos  33<L>  03-15    LIVER FUNCTIONS - ( 15 Mar 2023 00:27 )  Alb: 4.6 g/dL / Pro: 6.2 g/dL / ALK PHOS: 33 U/L / ALT: 22 U/L / AST: 42 U/L / GGT: x           PT/INR - ( 15 Mar 2023 00:27 )   PT: 13.2 sec;   INR: 1.15 ratio         PTT - ( 15 Mar 2023 00:27 )  PTT:25.2 sec  ABG - ( 15 Mar 2023 02:59 )  pH, Arterial: 7.42  pH, Blood: x     /  pCO2: 37    /  pO2: 61    / HCO3: 24    / Base Excess: -0.3  /  SaO2: 95.0      ------------------------------------------------------------------------------------------------------------------------------  Assessment:  53 yo M with PMHx bicuspid AV, severe AI, now s/p mini sternotomy, AVR-t, aortic root enlargement on 3/14/23.     s/p mini sternotomy, AVR-t, aortic root enlargement on 3/14/23  Post-operative pain  At risk for hemodynamic instability and cardiac arrhythmias  Acute post-operative respiratory insufficiency  Hypovolemia   Stress hyperglycemia   Thrombocytopenia  Lactic Acidosis   Acute blood loss anemia      Plan:   ***Neuro***  Post operative neuro assessment when sedation / anesthesia has cleared.  Sedation as needed while intubated. *Remove if non applicable*  Postoperative acute pain control with Tylenol, Gabapentin, Meperidine, PRN Oxycodone, and PRN Dilaudid       ***Cardiovascular***  Invasive hemodynamic monitoring, assess perfusion indices   At high risk for hemodynamic instability and cardiac arrhythmias.  IVF for perioperative hypovolemia.  Lactate 2.5, continue trending  Amiodarone for A. Fib prophylaxis   Cardene for BP management   Monitor chest tube output.      ***Pulmonary***  Postoperative acute respiratory insufficiency - HFNC 50L/70%  Deep breathing and coughing exercises.  Wean oxygen as able.      ***GI***  Tolerating Diet   Protein calorie malnutrition - Tube feeds via nasal feeding tube.  Protonix for stress ulcer prophylaxis   Bowel regimen with Miralax and Senna     ***Renal***  Brasher catheter for strict I/O measurements.  Replete electrolytes as indicated.      ***ID***  Cefuroxime for perioperative antibiotic coverage     ***Endocrine***  Stress Hyperglycemia  Insulin as needed to maintain euglycemia.     ***Hematology***  Acute blood loss anemia and thrombocytopenia - no current transfusion indication      Patient requires continuous monitoring with bedside rhythm monitoring, pulse oximetry monitoring, and continuous central venous and arterial pressure monitoring; and intermittent blood gas analysis. Care plan discussed with the ICU care team.   Patient remained critical, at risk for life threatening decompensation.    I have spent 30 minutes providing critical care management to this patient.    By signing my name below, I, Burt Rosado, attest that this documentation has been prepared under the direction and in the presence of Dora Ray MD  Electronically signed: Burt Rosado, 03-15-23 @ 07:52    I, Dora Ray MD, personally performed the services described in this documentation. all medical record entries made by the scribe were at my direction and in my presence. I have reviewed the chart and agree that the record reflects my personal performance and is accurate and complete  Electronically signed: Dora Ray MD Hospital Account Financial Class: Medicare Advantage    June 29, 2018 Patient seen and examined at the bedside.    Remained critically ill on continuous ICU monitoring.      Brief Summary:  53 yo M with PMHx bicuspid AV, severe AI, now s/p mini sternotomy, AVR-t, aortic root enlargement on 3/14/23.       24 Hour events:  - Come down on High Flow Nasal Cannula  -  off since 7 PM yesterday  - Consider giving higher dose of Lopressor   - Consider hydralazine later    OBJECTIVE:  Vital Signs Last 24 Hrs  T(C): 37.6 (15 Mar 2023 07:00), Max: 37.8 (15 Mar 2023 04:00)  T(F): 99.7 (15 Mar 2023 07:00), Max: 100 (15 Mar 2023 04:00)  HR: 79 (15 Mar 2023 07:30) (63 - 96)  BP: --  BP(mean): --  RR: 17 (15 Mar 2023 07:30) (9 - 36)  SpO2: 92% (15 Mar 2023 07:30) (90% - 100%)    Parameters below as of 15 Mar 2023 07:00  Patient On (Oxygen Delivery Method): nasal cannula, high flow  O2 Flow (L/min): 50  O2 Concentration (%): 70    Mode: CPAP with PS  FiO2: 50  PEEP: 5  PS: 5  ITime: 1  MAP: 8  PIP: 18              Physical Exam:   General: AAOx3  Neurology: intact  Eyes: bilateral pupils equal and reactive   ENT/Neck: Neck supple, trachea midline  Respiratory: on nasal cannula   CV: NSR, no murmurs        [x] Sternal dressing, [x] Mediastinal CT, [x] Pleural CT, [x] TD drain  Abdominal: Soft, NT  Extremities: 1-2+ pedal edema noted, + peripheral pulses, warm to palpation  Skin: No Rashes, Hematoma, Ecchymosis    -------------------------------------------------------------------------------------------------------------------------------  Labs:                        10.3   11.04 )-----------( 130      ( 15 Mar 2023 00:27 )             30.2     15    140  |  104  |  14  ----------------------------<  144<H>  4.0   |  23  |  0.80    Ca    8.6      15 Mar 2023 00:27  Phos  2.7     03-15  Mg     2.1     03-15    TPro  6.2  /  Alb  4.6  /  TBili  1.0  /  DBili  x   /  AST  42<H>  /  ALT  22  /  AlkPhos  33<L>  03-15    LIVER FUNCTIONS - ( 15 Mar 2023 00:27 )  Alb: 4.6 g/dL / Pro: 6.2 g/dL / ALK PHOS: 33 U/L / ALT: 22 U/L / AST: 42 U/L / GGT: x           PT/INR - ( 15 Mar 2023 00:27 )   PT: 13.2 sec;   INR: 1.15 ratio         PTT - ( 15 Mar 2023 00:27 )  PTT:25.2 sec  ABG - ( 15 Mar 2023 02:59 )  pH, Arterial: 7.42  pH, Blood: x     /  pCO2: 37    /  pO2: 61    / HCO3: 24    / Base Excess: -0.3  /  SaO2: 95.0      ------------------------------------------------------------------------------------------------------------------------------  Assessment:  53 yo M with PMHx bicuspid AV, severe AI, now s/p mini sternotomy, AVR-t, aortic root enlargement on 3/14/23.     s/p mini sternotomy, AVR-t, aortic root enlargement on 3/14/23  Post-operative pain  At risk for hemodynamic instability and cardiac arrhythmias  Acute post-operative respiratory insufficiency  Hypovolemia   Stress hyperglycemia   Thrombocytopenia  Lactic Acidosis   Acute blood loss anemia      Plan:   ***Neuro***  Post operative neuro assessment when sedation / anesthesia has cleared.  Sedation as needed while intubated. *Remove if non applicable*  Postoperative acute pain control with Tylenol, Gabapentin, Meperidine, PRN Oxycodone, and PRN Dilaudid       ***Cardiovascular***  Invasive hemodynamic monitoring, assess perfusion indices   At high risk for hemodynamic instability and cardiac arrhythmias.  IVF for perioperative hypovolemia.  Lactate 2.5, continue trending  Amiodarone for A. Fib prophylaxis   Cardene for BP management   Monitor chest tube output.  Consider giving higher dose of Lopressor       ***Pulmonary***  Postoperative acute respiratory insufficiency - HFNC 50L/70%  Deep breathing and coughing exercises.  Wean oxygen as able.      ***GI***  Tolerating Diet   Protein calorie malnutrition - Tube feeds via nasal feeding tube.  Protonix for stress ulcer prophylaxis   Bowel regimen with Miralax and Senna     ***Renal***  Brasher catheter for strict I/O measurements.  Replete electrolytes as indicated.      ***ID***  Cefuroxime for perioperative antibiotic coverage     ***Endocrine***  Stress Hyperglycemia  Insulin as needed to maintain euglycemia.     ***Hematology***  Acute blood loss anemia and thrombocytopenia - no current transfusion indication      Patient requires continuous monitoring with bedside rhythm monitoring, pulse oximetry monitoring, and continuous central venous and arterial pressure monitoring; and intermittent blood gas analysis. Care plan discussed with the ICU care team.   Patient remained critical, at risk for life threatening decompensation.    I have spent 30 minutes providing critical care management to this patient.    By signing my name below, I, Burt Rosado, attest that this documentation has been prepared under the direction and in the presence of Dora Ray MD  Electronically signed: Burt Rosado, 03-15-23 @ 07:52    I, Dora Ray MD, personally performed the services described in this documentation. all medical record entries made by the scribe were at my direction and in my presence. I have reviewed the chart and agree that the record reflects my personal performance and is accurate and complete  Electronically signed: Dora Ray MD Patient seen and examined at the bedside.    Remains critically ill on continuous ICU monitoring.      Brief Summary:  53 yo M with PMHx bicuspid AV, severe AI, now s/p mini sternotomy, AVR-t, aortic root enlargement on 3/14/23.       24 Hour events:  On Cardene overnight for hypertension - weaning, likely causing shunting  Dobutamine weaned to off.  Beta blocker started.  On high flow nasal cannula.      Objective:  Vital Signs Last 24 Hrs  T(C): 37.6 (15 Mar 2023 07:00), Max: 37.8 (15 Mar 2023 04:00)  T(F): 99.7 (15 Mar 2023 07:00), Max: 100 (15 Mar 2023 04:00)  HR: 79 (15 Mar 2023 07:30) (63 - 96)  BP: --  BP(mean): --  RR: 17 (15 Mar 2023 07:30) (9 - 36)  SpO2: 92% (15 Mar 2023 07:30) (90% - 100%)    Parameters below as of 15 Mar 2023 07:00  Patient On (Oxygen Delivery Method): nasal cannula, high flow  O2 Flow (L/min): 50  O2 Concentration (%): 70              Physical Exam:   General: Interactive, sitting up on high flow nasal cannula  Neurology: Alert and oriented, following commands.  Respiratory: Breath sounds bilaterally   CV: Sinus  Abdominal: Soft, Nontender  Extremities: Warm  Brasher in place     -------------------------------------------------------------------------------------------------------------------------------  Labs:                        10.3   11.04 )-----------( 130      ( 15 Mar 2023 00:27 )             30.2     03-15    140  |  104  |  14  ----------------------------<  144<H>  4.0   |  23  |  0.80    Ca    8.6      15 Mar 2023 00:27  Phos  2.7     03-15  Mg     2.1     03-15    TPro  6.2  /  Alb  4.6  /  TBili  1.0  /  DBili  x   /  AST  42<H>  /  ALT  22  /  AlkPhos  33<L>  03-15    LIVER FUNCTIONS - ( 15 Mar 2023 00:27 )  Alb: 4.6 g/dL / Pro: 6.2 g/dL / ALK PHOS: 33 U/L / ALT: 22 U/L / AST: 42 U/L / GGT: x           PT/INR - ( 15 Mar 2023 00:27 )   PT: 13.2 sec;   INR: 1.15 ratio         PTT - ( 15 Mar 2023 00:27 )  PTT:25.2 sec  ABG - ( 15 Mar 2023 02:59 )  pH, Arterial: 7.42  pH, Blood: x     /  pCO2: 37    /  pO2: 61    / HCO3: 24    / Base Excess: -0.3  /  SaO2: 95.0      ------------------------------------------------------------------------------------------------------------------------------  Assessment:  53 yo M with PMHx bicuspid AV, severe AI, now s/p mini sternotomy, AVR-t, aortic root enlargement on 3/14/23.     Post-operative acute pain  At risk for hemodynamic instability and cardiac arrhythmias  Acute post-operative respiratory insufficiency  Stress hyperglycemia       Plan:   ***Neuro***  Postoperative acute pain control with Tylenol, Gabapentin, PCA and prns.    ***Cardiovascular***  At high risk for hemodynamic instability and cardiac arrhythmias.  IVF for perioperative hypovolemia.  Trend lactate.  Amiodarone for A. Fib prophylaxis   Beta blocker for heart rate control  Monitor chest tube output.    ***Pulmonary***  Postoperative acute respiratory insufficiency  Deep breathing and coughing exercises.  Wean oxygen as able.    ***GI***  Tolerating Diet   Protonix for stress ulcer prophylaxis   Bowel regimen with Miralax and Senna     ***Renal***  Trend creatinine  Brasher catheter for strict I/O measurements.  Replete electrolytes as indicated.    ***ID***  Cefuroxime for perioperative antibiotic coverage     ***Endocrine***  Stress Hyperglycemia  Insulin infusion and frequent BG checks.    ***Hematology***  Acute blood loss anemia and thrombocytopenia - no current transfusion indication      Patient requires continuous monitoring with bedside rhythm monitoring, pulse oximetry monitoring, and continuous central venous and arterial pressure monitoring; and intermittent blood gas analysis. Care plan discussed with the ICU care team.   Patient remains critical, at risk for life threatening decompensation.    I have spent 35 minutes providing critical care management to this patient.    By signing my name below, I, Burt Rosado, attest that this documentation has been prepared under the direction and in the presence of Dora Ray MD  Electronically signed: Burt Rosado, 03-15-23 @ 07:52    I, Dora Ray MD, personally performed the services described in this documentation. all medical record entries made by the scribe were at my direction and in my presence. I have reviewed the chart and agree that the record reflects my personal performance and is accurate and complete  Electronically signed: Dora Ray MD

## 2023-03-16 LAB
ALBUMIN SERPL ELPH-MCNC: 4.1 G/DL — SIGNIFICANT CHANGE UP (ref 3.3–5)
ALP SERPL-CCNC: 29 U/L — LOW (ref 40–120)
ALT FLD-CCNC: 23 U/L — SIGNIFICANT CHANGE UP (ref 10–45)
ANION GAP SERPL CALC-SCNC: 9 MMOL/L — SIGNIFICANT CHANGE UP (ref 5–17)
AST SERPL-CCNC: 29 U/L — SIGNIFICANT CHANGE UP (ref 10–40)
BILIRUB SERPL-MCNC: 0.8 MG/DL — SIGNIFICANT CHANGE UP (ref 0.2–1.2)
BUN SERPL-MCNC: 13 MG/DL — SIGNIFICANT CHANGE UP (ref 7–23)
CALCIUM SERPL-MCNC: 8.6 MG/DL — SIGNIFICANT CHANGE UP (ref 8.4–10.5)
CHLORIDE SERPL-SCNC: 104 MMOL/L — SIGNIFICANT CHANGE UP (ref 96–108)
CO2 SERPL-SCNC: 25 MMOL/L — SIGNIFICANT CHANGE UP (ref 22–31)
CREAT SERPL-MCNC: 0.76 MG/DL — SIGNIFICANT CHANGE UP (ref 0.5–1.3)
EGFR: 108 ML/MIN/1.73M2 — SIGNIFICANT CHANGE UP
GAS PNL BLDA: SIGNIFICANT CHANGE UP
GLUCOSE BLDC GLUCOMTR-MCNC: 120 MG/DL — HIGH (ref 70–99)
GLUCOSE BLDC GLUCOMTR-MCNC: 120 MG/DL — HIGH (ref 70–99)
GLUCOSE BLDC GLUCOMTR-MCNC: 125 MG/DL — HIGH (ref 70–99)
GLUCOSE BLDC GLUCOMTR-MCNC: 209 MG/DL — HIGH (ref 70–99)
GLUCOSE SERPL-MCNC: 121 MG/DL — HIGH (ref 70–99)
HCT VFR BLD CALC: 27.7 % — LOW (ref 39–50)
HGB BLD-MCNC: 9.6 G/DL — LOW (ref 13–17)
MAGNESIUM SERPL-MCNC: 2.1 MG/DL — SIGNIFICANT CHANGE UP (ref 1.6–2.6)
MCHC RBC-ENTMCNC: 30.8 PG — SIGNIFICANT CHANGE UP (ref 27–34)
MCHC RBC-ENTMCNC: 34.7 GM/DL — SIGNIFICANT CHANGE UP (ref 32–36)
MCV RBC AUTO: 88.8 FL — SIGNIFICANT CHANGE UP (ref 80–100)
NRBC # BLD: 0 /100 WBCS — SIGNIFICANT CHANGE UP (ref 0–0)
PHOSPHATE SERPL-MCNC: 2 MG/DL — LOW (ref 2.5–4.5)
PLATELET # BLD AUTO: 118 K/UL — LOW (ref 150–400)
POTASSIUM SERPL-MCNC: 4 MMOL/L — SIGNIFICANT CHANGE UP (ref 3.5–5.3)
POTASSIUM SERPL-SCNC: 4 MMOL/L — SIGNIFICANT CHANGE UP (ref 3.5–5.3)
PROT SERPL-MCNC: 5.8 G/DL — LOW (ref 6–8.3)
RBC # BLD: 3.12 M/UL — LOW (ref 4.2–5.8)
RBC # FLD: 12.7 % — SIGNIFICANT CHANGE UP (ref 10.3–14.5)
SODIUM SERPL-SCNC: 138 MMOL/L — SIGNIFICANT CHANGE UP (ref 135–145)
WBC # BLD: 11.8 K/UL — HIGH (ref 3.8–10.5)
WBC # FLD AUTO: 11.8 K/UL — HIGH (ref 3.8–10.5)

## 2023-03-16 PROCEDURE — 71045 X-RAY EXAM CHEST 1 VIEW: CPT | Mod: 26

## 2023-03-16 PROCEDURE — 99291 CRITICAL CARE FIRST HOUR: CPT

## 2023-03-16 PROCEDURE — 93010 ELECTROCARDIOGRAM REPORT: CPT

## 2023-03-16 RX ORDER — FUROSEMIDE 40 MG
20 TABLET ORAL ONCE
Refills: 0 | Status: COMPLETED | OUTPATIENT
Start: 2023-03-16 | End: 2023-03-16

## 2023-03-16 RX ORDER — FUROSEMIDE 40 MG
40 TABLET ORAL ONCE
Refills: 0 | Status: DISCONTINUED | OUTPATIENT
Start: 2023-03-16 | End: 2023-03-16

## 2023-03-16 RX ORDER — MAGNESIUM SULFATE 500 MG/ML
2 VIAL (ML) INJECTION ONCE
Refills: 0 | Status: COMPLETED | OUTPATIENT
Start: 2023-03-16 | End: 2023-03-16

## 2023-03-16 RX ORDER — METOPROLOL TARTRATE 50 MG
75 TABLET ORAL EVERY 8 HOURS
Refills: 0 | Status: DISCONTINUED | OUTPATIENT
Start: 2023-03-16 | End: 2023-03-16

## 2023-03-16 RX ORDER — HYDRALAZINE HCL 50 MG
10 TABLET ORAL ONCE
Refills: 0 | Status: COMPLETED | OUTPATIENT
Start: 2023-03-16 | End: 2023-03-16

## 2023-03-16 RX ORDER — HYDRALAZINE HCL 50 MG
5 TABLET ORAL ONCE
Refills: 0 | Status: COMPLETED | OUTPATIENT
Start: 2023-03-16 | End: 2023-03-16

## 2023-03-16 RX ORDER — METOPROLOL TARTRATE 50 MG
100 TABLET ORAL EVERY 8 HOURS
Refills: 0 | Status: DISCONTINUED | OUTPATIENT
Start: 2023-03-16 | End: 2023-03-20

## 2023-03-16 RX ORDER — AMLODIPINE BESYLATE 2.5 MG/1
10 TABLET ORAL DAILY
Refills: 0 | Status: DISCONTINUED | OUTPATIENT
Start: 2023-03-16 | End: 2023-03-22

## 2023-03-16 RX ADMIN — HYDROMORPHONE HYDROCHLORIDE 30 MILLILITER(S): 2 INJECTION INTRAMUSCULAR; INTRAVENOUS; SUBCUTANEOUS at 07:16

## 2023-03-16 RX ADMIN — Medication 30 MILLIGRAM(S): at 16:15

## 2023-03-16 RX ADMIN — Medication 25 GRAM(S): at 02:32

## 2023-03-16 RX ADMIN — AMLODIPINE BESYLATE 10 MILLIGRAM(S): 2.5 TABLET ORAL at 06:27

## 2023-03-16 RX ADMIN — Medication 10 MILLIGRAM(S): at 05:39

## 2023-03-16 RX ADMIN — Medication 30 MILLIGRAM(S): at 16:00

## 2023-03-16 RX ADMIN — PANTOPRAZOLE SODIUM 40 MILLIGRAM(S): 20 TABLET, DELAYED RELEASE ORAL at 06:27

## 2023-03-16 RX ADMIN — Medication 81 MILLIGRAM(S): at 13:28

## 2023-03-16 RX ADMIN — Medication 650 MILLIGRAM(S): at 17:41

## 2023-03-16 RX ADMIN — Medication 75 MILLIGRAM(S): at 13:28

## 2023-03-16 RX ADMIN — Medication 5 MILLIGRAM(S): at 14:31

## 2023-03-16 RX ADMIN — AMIODARONE HYDROCHLORIDE 400 MILLIGRAM(S): 400 TABLET ORAL at 05:39

## 2023-03-16 RX ADMIN — Medication 650 MILLIGRAM(S): at 14:00

## 2023-03-16 RX ADMIN — Medication 20 MILLIGRAM(S): at 15:00

## 2023-03-16 RX ADMIN — NICARDIPINE HYDROCHLORIDE 10 MG/HR: 30 CAPSULE, EXTENDED RELEASE ORAL at 08:37

## 2023-03-16 RX ADMIN — Medication 4: at 06:31

## 2023-03-16 RX ADMIN — Medication 10 MILLIGRAM(S): at 16:15

## 2023-03-16 RX ADMIN — CHLORHEXIDINE GLUCONATE 1 APPLICATION(S): 213 SOLUTION TOPICAL at 21:31

## 2023-03-16 RX ADMIN — Medication 650 MILLIGRAM(S): at 18:26

## 2023-03-16 RX ADMIN — Medication 20 MILLIGRAM(S): at 01:18

## 2023-03-16 RX ADMIN — GABAPENTIN 100 MILLIGRAM(S): 400 CAPSULE ORAL at 05:39

## 2023-03-16 RX ADMIN — POLYETHYLENE GLYCOL 3350 17 GRAM(S): 17 POWDER, FOR SOLUTION ORAL at 13:28

## 2023-03-16 RX ADMIN — Medication 650 MILLIGRAM(S): at 00:34

## 2023-03-16 RX ADMIN — GABAPENTIN 100 MILLIGRAM(S): 400 CAPSULE ORAL at 13:28

## 2023-03-16 RX ADMIN — AMIODARONE HYDROCHLORIDE 400 MILLIGRAM(S): 400 TABLET ORAL at 17:41

## 2023-03-16 RX ADMIN — Medication 75 MILLIGRAM(S): at 08:38

## 2023-03-16 RX ADMIN — Medication 650 MILLIGRAM(S): at 13:28

## 2023-03-16 RX ADMIN — Medication 50 MILLIGRAM(S): at 00:04

## 2023-03-16 RX ADMIN — Medication 650 MILLIGRAM(S): at 05:39

## 2023-03-16 RX ADMIN — ENOXAPARIN SODIUM 40 MILLIGRAM(S): 100 INJECTION SUBCUTANEOUS at 08:43

## 2023-03-16 RX ADMIN — Medication 650 MILLIGRAM(S): at 06:09

## 2023-03-16 RX ADMIN — Medication 650 MILLIGRAM(S): at 23:36

## 2023-03-16 RX ADMIN — Medication 30 MILLIGRAM(S): at 05:38

## 2023-03-16 RX ADMIN — Medication 650 MILLIGRAM(S): at 00:04

## 2023-03-16 RX ADMIN — HYDROMORPHONE HYDROCHLORIDE 30 MILLILITER(S): 2 INJECTION INTRAMUSCULAR; INTRAVENOUS; SUBCUTANEOUS at 19:07

## 2023-03-16 RX ADMIN — Medication 500 MILLIGRAM(S): at 05:39

## 2023-03-16 RX ADMIN — SENNA PLUS 2 TABLET(S): 8.6 TABLET ORAL at 21:39

## 2023-03-16 RX ADMIN — Medication 30 MILLIGRAM(S): at 05:53

## 2023-03-16 RX ADMIN — Medication 500 MILLIGRAM(S): at 17:46

## 2023-03-16 RX ADMIN — Medication 85 MILLIMOLE(S): at 03:42

## 2023-03-16 RX ADMIN — GABAPENTIN 100 MILLIGRAM(S): 400 CAPSULE ORAL at 21:39

## 2023-03-16 RX ADMIN — Medication 100 MILLIGRAM(S): at 20:34

## 2023-03-16 RX ADMIN — Medication 10 MILLIGRAM(S): at 16:00

## 2023-03-16 NOTE — PROGRESS NOTE ADULT - SUBJECTIVE AND OBJECTIVE BOX
CRITICAL CARE ATTENDING - CTICU    MEDICATIONS  (STANDING):  acetaminophen     Tablet .. 650 milliGRAM(s) Oral every 6 hours  aMIOdarone    Tablet 400 milliGRAM(s) Oral two times a day  amLODIPine   Tablet 10 milliGRAM(s) Oral daily  ascorbic acid 500 milliGRAM(s) Oral two times a day  aspirin enteric coated 81 milliGRAM(s) Oral daily  bisacodyl Suppository 10 milliGRAM(s) Rectal once  chlorhexidine 2% Cloths 1 Application(s) Topical daily  dextrose 5%. 1000 milliLiter(s) (50 mL/Hr) IV Continuous <Continuous>  dextrose 5%. 1000 milliLiter(s) (100 mL/Hr) IV Continuous <Continuous>  dextrose 50% Injectable 50 milliLiter(s) IV Push every 15 minutes  dextrose 50% Injectable 25 milliLiter(s) IV Push every 15 minutes  enoxaparin Injectable 40 milliGRAM(s) SubCutaneous every 24 hours  gabapentin 100 milliGRAM(s) Oral every 8 hours  glucagon  Injectable 1 milliGRAM(s) IntraMuscular once  HYDROmorphone PCA (1 mG/mL) 30 milliLiter(s) PCA Continuous PCA Continuous  insulin lispro (ADMELOG) corrective regimen sliding scale   SubCutaneous three times a day before meals  insulin lispro (ADMELOG) corrective regimen sliding scale   SubCutaneous at bedtime  insulin regular Infusion 3 Unit(s)/Hr (3 mL/Hr) IV Continuous <Continuous>  ketorolac   Injectable 30 milliGRAM(s) IV Push every 8 hours  metoclopramide Injectable 10 milliGRAM(s) IV Push every 8 hours  metoprolol tartrate 50 milliGRAM(s) Oral every 8 hours  niCARdipine Infusion 2 mG/Hr (10 mL/Hr) IV Continuous <Continuous>  pantoprazole    Tablet 40 milliGRAM(s) Oral before breakfast  polyethylene glycol 3350 17 Gram(s) Oral daily  potassium chloride  10 mEq/50 mL IVPB 10 milliEquivalent(s) IV Intermittent every 1 hour  potassium chloride  10 mEq/50 mL IVPB 10 milliEquivalent(s) IV Intermittent every 1 hour  potassium chloride  10 mEq/50 mL IVPB 10 milliEquivalent(s) IV Intermittent every 1 hour  senna 2 Tablet(s) Oral at bedtime  sodium chloride 0.9%. 1000 milliLiter(s) (10 mL/Hr) IV Continuous <Continuous>  sodium chloride 0.9%. 1000 milliLiter(s) (30 mL/Hr) IV Continuous <Continuous>                                    9.6    11.80 )-----------( 118      ( 16 Mar 2023 00:30 )             27.7           138  |  104  |  13  ----------------------------<  121<H>  4.0   |  25  |  0.76    Ca    8.6      16 Mar 2023 00:30  Phos  2.0       Mg     2.1         TPro  5.8<L>  /  Alb  4.1  /  TBili  0.8  /  DBili  x   /  AST  29  /  ALT  23  /  AlkPhos  29<L>        PT/INR - ( 15 Mar 2023 00:27 )   PT: 13.2 sec;   INR: 1.15 ratio         PTT - ( 15 Mar 2023 00:27 )  PTT:25.2 sec        Daily     Daily Weight in k.8 (16 Mar 2023 00:00)      03-15 @ 07:01  -  -16 @ 07:00  --------------------------------------------------------  IN: 4338.2 mL / OUT: 5195 mL / NET: -856.8 mL        Critically Ill patient  : [ ] preoperative ,   [x ] post operative    Requires :  [ x] Arterial Line   [x ] Central Line  [ ] PA catheter  [ ] IABP  [ ] ECMO  [ ] LVAD  [ ] Ventilator  [x ] pacemaker [ ] Impella.                      [x ] ABG's     [x ] Pulse Oxymetry Monitoring  Bedside evaluation , monitoring , treatment of hemodynamics , fluids , IVP/ IVCD meds.        Diagnosis:     POD 2: Mini Sternotomy, AVR(t), aortic root enlargement     Hypovolemia     Hemodynamic lability,  instability. Requires IVCD [x ] vasopressors [ ] inotropes  [ ] vasodilator  [ ]IVSS fluid  to maintain MAP, perfusion, C.I.     Chest Tube Drainage     Temporary pacemaker (TPM) interrogation and setting.     Requires chest PT, pulmonary toilet, suctioning to maintain SaO2,  patent airway and treat atelectasis.     Thrombocytopenia     IVCD Insulin and Admelog Sliding Scale     ASA/Lopressor     Hypoxemia - Requires [ ] BIPAP  [x ] HF O2 - 50L/50%         I, Yusef Woodward, personally performed the services described in this documentation. All medical record entries made by the scribe were at my direction and in my presence. I have reviewed the chart and agree that the record reflects my personal performance and is accurate and complete.   Yusef Woodward MD.       By signing my name below, I, Burt Rosado, attest that this documentation has been prepared under the direction and in the presence of Yusef Woodward MD.   Electronically Signed: Aiyana Elmore 23 @ 07:12        Discussed with CT surgeon, Physician Assistant - Nurse Practitioner- Critical care medicine team.   Dicussed at  AM / PM rounds.   Chart, labs , films reviewed.    Cumulative Critical Care Time Given Today:  CRITICAL CARE ATTENDING - CTICU    MEDICATIONS  (STANDING):  acetaminophen     Tablet .. 650 milliGRAM(s) Oral every 6 hours  aMIOdarone    Tablet 400 milliGRAM(s) Oral two times a day  amLODIPine   Tablet 10 milliGRAM(s) Oral daily  ascorbic acid 500 milliGRAM(s) Oral two times a day  aspirin enteric coated 81 milliGRAM(s) Oral daily  bisacodyl Suppository 10 milliGRAM(s) Rectal once  chlorhexidine 2% Cloths 1 Application(s) Topical daily  dextrose 5%. 1000 milliLiter(s) (50 mL/Hr) IV Continuous <Continuous>  dextrose 5%. 1000 milliLiter(s) (100 mL/Hr) IV Continuous <Continuous>  dextrose 50% Injectable 50 milliLiter(s) IV Push every 15 minutes  dextrose 50% Injectable 25 milliLiter(s) IV Push every 15 minutes  enoxaparin Injectable 40 milliGRAM(s) SubCutaneous every 24 hours  gabapentin 100 milliGRAM(s) Oral every 8 hours  glucagon  Injectable 1 milliGRAM(s) IntraMuscular once  HYDROmorphone PCA (1 mG/mL) 30 milliLiter(s) PCA Continuous PCA Continuous  insulin lispro (ADMELOG) corrective regimen sliding scale   SubCutaneous three times a day before meals  insulin lispro (ADMELOG) corrective regimen sliding scale   SubCutaneous at bedtime  insulin regular Infusion 3 Unit(s)/Hr (3 mL/Hr) IV Continuous <Continuous>  ketorolac   Injectable 30 milliGRAM(s) IV Push every 8 hours  metoclopramide Injectable 10 milliGRAM(s) IV Push every 8 hours  metoprolol tartrate 50 milliGRAM(s) Oral every 8 hours  niCARdipine Infusion 2 mG/Hr (10 mL/Hr) IV Continuous <Continuous>  pantoprazole    Tablet 40 milliGRAM(s) Oral before breakfast  polyethylene glycol 3350 17 Gram(s) Oral daily  potassium chloride  10 mEq/50 mL IVPB 10 milliEquivalent(s) IV Intermittent every 1 hour  potassium chloride  10 mEq/50 mL IVPB 10 milliEquivalent(s) IV Intermittent every 1 hour  potassium chloride  10 mEq/50 mL IVPB 10 milliEquivalent(s) IV Intermittent every 1 hour  senna 2 Tablet(s) Oral at bedtime  sodium chloride 0.9%. 1000 milliLiter(s) (10 mL/Hr) IV Continuous <Continuous>  sodium chloride 0.9%. 1000 milliLiter(s) (30 mL/Hr) IV Continuous <Continuous>                                    9.6    11.80 )-----------( 118      ( 16 Mar 2023 00:30 )             27.7       -    138  |  104  |  13  ----------------------------<  121<H>  4.0   |  25  |  0.76    Ca    8.6      16 Mar 2023 00:30  Phos  2.0       Mg     2.1         TPro  5.8<L>  /  Alb  4.1  /  TBili  0.8  /  DBili  x   /  AST  29  /  ALT  23  /  AlkPhos  29<L>        PT/INR - ( 15 Mar 2023 00:27 )   PT: 13.2 sec;   INR: 1.15 ratio         PTT - ( 15 Mar 2023 00:27 )  PTT:25.2 sec        Daily     Daily Weight in k.8 (16 Mar 2023 00:00)      03-15 @ 07:01  -  -16 @ 07:00  --------------------------------------------------------  IN: 4338.2 mL / OUT: 5195 mL / NET: -856.8 mL        Critically Ill patient  : [ ] preoperative ,   [x ] post operative    Requires :  [ x] Arterial Line   [x ] Central Line  [ ] PA catheter  [ ] IABP  [ ] ECMO  [ ] LVAD  [ ] Ventilator  [x ] pacemaker - TPM  [ ] Impella.                      [x ] ABG's     [x ] Pulse Oxymetry Monitoring  Bedside evaluation , monitoring , treatment of hemodynamics , fluids , IVP/ IVCD meds.        Diagnosis:     POD 2: Mini Sternotomy, /  AVR(t), /  aortic root enlargement     Hypertension    Fluid  overload      Hemodynamic lability,  instability. Requires IVCD [x ] vasopressors - on/off  [ ] inotropes  [ x] vasodilator  [ ]IVSS fluid  to maintain MAP, perfusion, C.I.     Chest Tube Drainage     Temporary pacemaker (TPM) interrogation and setting.     Requires chest PT, pulmonary toilet, suctioning to maintain SaO2,  patent airway and treat atelectasis.     Thrombocytopenia     IVCD Insulin and Admelog Sliding Scale     ASA/Lopressor     Respiratory insuffiencey     Hypoxemia - Requires [ ] BIPAP  [x ] HF O2 - 50L/50%     IVCD insulin         I, Yusef Woodward, personally performed the services described in this documentation. All medical record entries made by the scribe were at my direction and in my presence. I have reviewed the chart and agree that the record reflects my personal performance and is accurate and complete.   Yusef Woodward MD.       By signing my name below, I, Burt Rosado, attest that this documentation has been prepared under the direction and in the presence of Yusef Woodward MD.   Electronically Signed: Aiyana Elmore 23 @ 07:12        Discussed with CT surgeon, Physician Assistant - Nurse Practitioner- Critical care medicine team.   Dicussed at  AM / PM rounds.   Chart, labs , films reviewed.    Cumulative Critical Care Time Given Today:  30 min

## 2023-03-16 NOTE — PROGRESS NOTE ADULT - SUBJECTIVE AND OBJECTIVE BOX
Day 2 of Anesthesia Pain Management Service    SUBJECTIVE: I'm doing ok    Pain Scale Score:	[X] Refer to charted pain scores    THERAPY:    [ ] IV PCA Morphine		[ ] 5 mg/mL	[ ] 1 mg/mL  [X] IV PCA Hydromorphone	[ ] 5 mg/mL	[X] 1 mg/mL  [ ] IV PCA Fentanyl		[ ] 50 micrograms/mL    Demand dose: 0.2 mg     Lockout: 6 minutes   Continuous Rate: 0 mg/hr  4 Hour Limit: 4 mg    MEDICATIONS  (STANDING):  acetaminophen     Tablet 650 milliGRAM(s) Oral every 6 hours  aMIOdarone    Tablet 400 milliGRAM(s) Oral two times a day  amLODIPine   Tablet 10 milliGRAM(s) Oral daily  ascorbic acid 500 milliGRAM(s) Oral two times a day  aspirin enteric coated 81 milliGRAM(s) Oral daily  bisacodyl Suppository 10 milliGRAM(s) Rectal once  chlorhexidine 2% Cloths 1 Application(s) Topical daily  dextrose 5%. 1000 milliLiter(s) (50 mL/Hr) IV Continuous <Continuous>  dextrose 5%. 1000 milliLiter(s) (100 mL/Hr) IV Continuous <Continuous>  dextrose 50% Injectable 50 milliLiter(s) IV Push every 15 minutes  dextrose 50% Injectable 25 milliLiter(s) IV Push every 15 minutes  enoxaparin Injectable 40 milliGRAM(s) SubCutaneous every 24 hours  gabapentin 100 milliGRAM(s) Oral every 8 hours  glucagon  Injectable 1 milliGRAM(s) IntraMuscular once  HYDROmorphone PCA (1 mG/mL) 30 milliLiter(s) PCA Continuous PCA Continuous  insulin lispro (ADMELOG) corrective regimen sliding scale   SubCutaneous three times a day before meals  insulin lispro (ADMELOG) corrective regimen sliding scale   SubCutaneous at bedtime  ketorolac   Injectable 30 milliGRAM(s) IV Push every 8 hours  metoclopramide Injectable 10 milliGRAM(s) IV Push every 8 hours  metoprolol tartrate 75 milliGRAM(s) Oral every 8 hours  niCARdipine Infusion 2 mG/Hr (10 mL/Hr) IV Continuous <Continuous>  pantoprazole    Tablet 40 milliGRAM(s) Oral before breakfast  polyethylene glycol 3350 17 Gram(s) Oral daily  potassium chloride  10 mEq/50 mL IVPB 10 milliEquivalent(s) IV Intermittent every 1 hour  potassium chloride  10 mEq/50 mL IVPB 10 milliEquivalent(s) IV Intermittent every 1 hour  potassium chloride  10 mEq/50 mL IVPB 10 milliEquivalent(s) IV Intermittent every 1 hour  senna 2 Tablet(s) Oral at bedtime  sodium chloride 0.9%. 1000 milliLiter(s) (10 mL/Hr) IV Continuous <Continuous>  sodium chloride 0.9%. 1000 milliLiter(s) (30 mL/Hr) IV Continuous <Continuous>    MEDICATIONS  (PRN):  dextrose Oral Gel 15 Gram(s) Oral once PRN Blood Glucose LESS THAN 70 milliGRAM(s)/deciliter  HYDROmorphone  Injectable 0.5 milliGRAM(s) IV Push every 6 hours PRN Breakthrough Pain  HYDROmorphone PCA (1 mG/mL) Rescue Clinician Bolus 0.5 milliGRAM(s) IV Push every 15 minutes PRN for Pain Scale GREATER THAN 6  lidocaine   4% Patch 1 Patch Transdermal daily PRN pain at surgical site  naloxone Injectable 0.1 milliGRAM(s) IV Push every 3 minutes PRN For ANY of the following changes in patient status:  A. RR LESS THAN 10 breaths per minute, B. Oxygen saturation LESS THAN 90%, C. Sedation score of 6  ondansetron Injectable 4 milliGRAM(s) IV Push every 6 hours PRN Nausea  oxyCODONE    IR 5 milliGRAM(s) Oral every 4 hours PRN Moderate Pain (4 - 6)  oxyCODONE    IR 10 milliGRAM(s) Oral every 4 hours PRN Severe Pain (7 - 10)      OBJECTIVE:    Sedation Score:	[ X] Alert 	[ ] Drowsy 	[ ] Arousable	[ ] Asleep	[ ] Unresponsive    Side Effects:	[X ] None	[ ] Nausea	[ ] Vomiting	[ ] Pruritus  		[ ] Other:    Vital Signs Last 24 Hrs  T(C): 37.1 (16 Mar 2023 04:00), Max: 37.7 (15 Mar 2023 16:00)  T(F): 98.8 (16 Mar 2023 04:00), Max: 99.9 (15 Mar 2023 16:00)  HR: 74 (16 Mar 2023 09:00) (61 - 93)  BP: 110/60 (15 Mar 2023 18:30) (110/60 - 128/62)  BP(mean): 79 (15 Mar 2023 18:30) (79 - 87)  RR: 12 (16 Mar 2023 09:00) (11 - 26)  SpO2: 95% (16 Mar 2023 09:00) (87% - 99%)    Parameters below as of 16 Mar 2023 09:00  Patient On (Oxygen Delivery Method): nasal cannula, high flow  O2 Flow (L/min): 50  O2 Concentration (%): 40    ASSESSMENT/ PLAN    Therapy to  be:               [X] Continued   [ ] Discontinued   [ ] Changed to PRN Analgesics    Documentation and Verification of current medications:   [X] Done	[ ] Not done, not eligible    Comments: Total PCA use 4.4mg / 24hr. OOB in chair. + Chest tubes x 3.  Reeducated to use.

## 2023-03-17 DIAGNOSIS — Z95.2 PRESENCE OF PROSTHETIC HEART VALVE: ICD-10-CM

## 2023-03-17 LAB
ALBUMIN SERPL ELPH-MCNC: 3.5 G/DL — SIGNIFICANT CHANGE UP (ref 3.3–5)
ALP SERPL-CCNC: 34 U/L — LOW (ref 40–120)
ALT FLD-CCNC: 19 U/L — SIGNIFICANT CHANGE UP (ref 10–45)
ANION GAP SERPL CALC-SCNC: 10 MMOL/L — SIGNIFICANT CHANGE UP (ref 5–17)
AST SERPL-CCNC: 19 U/L — SIGNIFICANT CHANGE UP (ref 10–40)
BILIRUB SERPL-MCNC: 0.7 MG/DL — SIGNIFICANT CHANGE UP (ref 0.2–1.2)
BUN SERPL-MCNC: 16 MG/DL — SIGNIFICANT CHANGE UP (ref 7–23)
CALCIUM SERPL-MCNC: 8.4 MG/DL — SIGNIFICANT CHANGE UP (ref 8.4–10.5)
CHLORIDE SERPL-SCNC: 102 MMOL/L — SIGNIFICANT CHANGE UP (ref 96–108)
CO2 SERPL-SCNC: 24 MMOL/L — SIGNIFICANT CHANGE UP (ref 22–31)
CREAT SERPL-MCNC: 0.78 MG/DL — SIGNIFICANT CHANGE UP (ref 0.5–1.3)
EGFR: 107 ML/MIN/1.73M2 — SIGNIFICANT CHANGE UP
GLUCOSE BLDC GLUCOMTR-MCNC: 110 MG/DL — HIGH (ref 70–99)
GLUCOSE BLDC GLUCOMTR-MCNC: 117 MG/DL — HIGH (ref 70–99)
GLUCOSE BLDC GLUCOMTR-MCNC: 122 MG/DL — HIGH (ref 70–99)
GLUCOSE BLDC GLUCOMTR-MCNC: 131 MG/DL — HIGH (ref 70–99)
GLUCOSE SERPL-MCNC: 123 MG/DL — HIGH (ref 70–99)
HCT VFR BLD CALC: 28.7 % — LOW (ref 39–50)
HGB BLD-MCNC: 9.7 G/DL — LOW (ref 13–17)
MAGNESIUM SERPL-MCNC: 2.2 MG/DL — SIGNIFICANT CHANGE UP (ref 1.6–2.6)
MCHC RBC-ENTMCNC: 30.6 PG — SIGNIFICANT CHANGE UP (ref 27–34)
MCHC RBC-ENTMCNC: 33.8 GM/DL — SIGNIFICANT CHANGE UP (ref 32–36)
MCV RBC AUTO: 90.5 FL — SIGNIFICANT CHANGE UP (ref 80–100)
NRBC # BLD: 0 /100 WBCS — SIGNIFICANT CHANGE UP (ref 0–0)
PHOSPHATE SERPL-MCNC: 2.6 MG/DL — SIGNIFICANT CHANGE UP (ref 2.5–4.5)
PLATELET # BLD AUTO: 161 K/UL — SIGNIFICANT CHANGE UP (ref 150–400)
POTASSIUM SERPL-MCNC: 4.1 MMOL/L — SIGNIFICANT CHANGE UP (ref 3.5–5.3)
POTASSIUM SERPL-SCNC: 4.1 MMOL/L — SIGNIFICANT CHANGE UP (ref 3.5–5.3)
PROT SERPL-MCNC: 5.9 G/DL — LOW (ref 6–8.3)
RBC # BLD: 3.17 M/UL — LOW (ref 4.2–5.8)
RBC # FLD: 12.8 % — SIGNIFICANT CHANGE UP (ref 10.3–14.5)
SODIUM SERPL-SCNC: 136 MMOL/L — SIGNIFICANT CHANGE UP (ref 135–145)
WBC # BLD: 11.58 K/UL — HIGH (ref 3.8–10.5)
WBC # FLD AUTO: 11.58 K/UL — HIGH (ref 3.8–10.5)

## 2023-03-17 PROCEDURE — 71045 X-RAY EXAM CHEST 1 VIEW: CPT | Mod: 26

## 2023-03-17 PROCEDURE — 71045 X-RAY EXAM CHEST 1 VIEW: CPT | Mod: 26,77

## 2023-03-17 RX ORDER — HEPARIN SODIUM 5000 [USP'U]/ML
1000 INJECTION INTRAVENOUS; SUBCUTANEOUS
Qty: 25000 | Refills: 0 | Status: DISCONTINUED | OUTPATIENT
Start: 2023-03-17 | End: 2023-03-18

## 2023-03-17 RX ORDER — AMIODARONE HYDROCHLORIDE 400 MG/1
TABLET ORAL
Refills: 0 | Status: DISCONTINUED | OUTPATIENT
Start: 2023-03-17 | End: 2023-03-20

## 2023-03-17 RX ORDER — AMIODARONE HYDROCHLORIDE 400 MG/1
400 TABLET ORAL EVERY 8 HOURS
Refills: 0 | Status: DISCONTINUED | OUTPATIENT
Start: 2023-03-17 | End: 2023-03-20

## 2023-03-17 RX ORDER — HYDROMORPHONE HYDROCHLORIDE 2 MG/ML
0.5 INJECTION INTRAMUSCULAR; INTRAVENOUS; SUBCUTANEOUS EVERY 6 HOURS
Refills: 0 | Status: DISCONTINUED | OUTPATIENT
Start: 2023-03-17 | End: 2023-03-18

## 2023-03-17 RX ORDER — HYDROMORPHONE HYDROCHLORIDE 2 MG/ML
4 INJECTION INTRAMUSCULAR; INTRAVENOUS; SUBCUTANEOUS
Refills: 0 | Status: DISCONTINUED | OUTPATIENT
Start: 2023-03-17 | End: 2023-03-18

## 2023-03-17 RX ADMIN — HYDROMORPHONE HYDROCHLORIDE 4 MILLIGRAM(S): 2 INJECTION INTRAMUSCULAR; INTRAVENOUS; SUBCUTANEOUS at 17:16

## 2023-03-17 RX ADMIN — ENOXAPARIN SODIUM 40 MILLIGRAM(S): 100 INJECTION SUBCUTANEOUS at 10:36

## 2023-03-17 RX ADMIN — HYDROMORPHONE HYDROCHLORIDE 4 MILLIGRAM(S): 2 INJECTION INTRAMUSCULAR; INTRAVENOUS; SUBCUTANEOUS at 22:20

## 2023-03-17 RX ADMIN — HYDROMORPHONE HYDROCHLORIDE 0.5 MILLIGRAM(S): 2 INJECTION INTRAMUSCULAR; INTRAVENOUS; SUBCUTANEOUS at 17:39

## 2023-03-17 RX ADMIN — AMLODIPINE BESYLATE 10 MILLIGRAM(S): 2.5 TABLET ORAL at 05:42

## 2023-03-17 RX ADMIN — Medication 650 MILLIGRAM(S): at 11:40

## 2023-03-17 RX ADMIN — HYDROMORPHONE HYDROCHLORIDE 30 MILLILITER(S): 2 INJECTION INTRAMUSCULAR; INTRAVENOUS; SUBCUTANEOUS at 00:12

## 2023-03-17 RX ADMIN — HEPARIN SODIUM 10 UNIT(S)/HR: 5000 INJECTION INTRAVENOUS; SUBCUTANEOUS at 18:05

## 2023-03-17 RX ADMIN — Medication 500 MILLIGRAM(S): at 17:16

## 2023-03-17 RX ADMIN — HYDROMORPHONE HYDROCHLORIDE 4 MILLIGRAM(S): 2 INJECTION INTRAMUSCULAR; INTRAVENOUS; SUBCUTANEOUS at 17:46

## 2023-03-17 RX ADMIN — Medication 100 MILLIGRAM(S): at 21:43

## 2023-03-17 RX ADMIN — SENNA PLUS 2 TABLET(S): 8.6 TABLET ORAL at 21:43

## 2023-03-17 RX ADMIN — AMIODARONE HYDROCHLORIDE 400 MILLIGRAM(S): 400 TABLET ORAL at 18:05

## 2023-03-17 RX ADMIN — AMIODARONE HYDROCHLORIDE 400 MILLIGRAM(S): 400 TABLET ORAL at 05:42

## 2023-03-17 RX ADMIN — PANTOPRAZOLE SODIUM 40 MILLIGRAM(S): 20 TABLET, DELAYED RELEASE ORAL at 07:13

## 2023-03-17 RX ADMIN — HYDROMORPHONE HYDROCHLORIDE 4 MILLIGRAM(S): 2 INJECTION INTRAMUSCULAR; INTRAVENOUS; SUBCUTANEOUS at 13:25

## 2023-03-17 RX ADMIN — Medication 100 MILLIGRAM(S): at 11:40

## 2023-03-17 RX ADMIN — GABAPENTIN 100 MILLIGRAM(S): 400 CAPSULE ORAL at 13:26

## 2023-03-17 RX ADMIN — Medication 81 MILLIGRAM(S): at 11:40

## 2023-03-17 RX ADMIN — GABAPENTIN 100 MILLIGRAM(S): 400 CAPSULE ORAL at 21:43

## 2023-03-17 RX ADMIN — Medication 650 MILLIGRAM(S): at 00:06

## 2023-03-17 RX ADMIN — Medication 500 MILLIGRAM(S): at 05:42

## 2023-03-17 RX ADMIN — HYDROMORPHONE HYDROCHLORIDE 0.5 MILLIGRAM(S): 2 INJECTION INTRAMUSCULAR; INTRAVENOUS; SUBCUTANEOUS at 18:09

## 2023-03-17 RX ADMIN — GABAPENTIN 100 MILLIGRAM(S): 400 CAPSULE ORAL at 05:43

## 2023-03-17 RX ADMIN — Medication 100 MILLIGRAM(S): at 05:42

## 2023-03-17 RX ADMIN — HYDROMORPHONE HYDROCHLORIDE 4 MILLIGRAM(S): 2 INJECTION INTRAMUSCULAR; INTRAVENOUS; SUBCUTANEOUS at 14:25

## 2023-03-17 RX ADMIN — HYDROMORPHONE HYDROCHLORIDE 30 MILLILITER(S): 2 INJECTION INTRAMUSCULAR; INTRAVENOUS; SUBCUTANEOUS at 07:09

## 2023-03-17 RX ADMIN — HYDROMORPHONE HYDROCHLORIDE 4 MILLIGRAM(S): 2 INJECTION INTRAMUSCULAR; INTRAVENOUS; SUBCUTANEOUS at 21:49

## 2023-03-17 RX ADMIN — POLYETHYLENE GLYCOL 3350 17 GRAM(S): 17 POWDER, FOR SOLUTION ORAL at 11:41

## 2023-03-17 RX ADMIN — CHLORHEXIDINE GLUCONATE 1 APPLICATION(S): 213 SOLUTION TOPICAL at 21:39

## 2023-03-17 RX ADMIN — Medication 650 MILLIGRAM(S): at 05:43

## 2023-03-17 NOTE — CONSULT NOTE ADULT - SUBJECTIVE AND OBJECTIVE BOX
HPI:    Primary Service HPI:  52 year old male, with a history of bicuspid aortic valve, presents for minimally invasive aortic valve replacement. Pt. reports experiencing mild dyspnea on exertion and denies exertional chest discomfort. Pt. reports lung nodule was incidentally noted on diagnostic imaging, s/p PET scan 3/8/23 as per request of Dr. Silva.    Pt. reports a mild case of COVID-2021, with residual diminished sense of smell. Pre procedure PCR scheduled for 3/10/23. (09 Mar 2023 11:26)    Interval Events:  - s/p AVR 3/14  - Has been in new atrial fibrillation > 24 hours on tele HR 65-85  - Seen at bedside patient denies chest pain, shortness of breath, palpitations LH/dizziness    PMH:   Aortic insufficiency    Bicuspid aortic valve    COVID-19    Lung nodule      PSH:   History of colonoscopy    History of endoscopy      Medications:   acetaminophen     Tablet .. 650 milliGRAM(s) Oral every 6 hours PRN  aMIOdarone    Tablet   Oral   aMIOdarone    Tablet 400 milliGRAM(s) Oral every 8 hours  amLODIPine   Tablet 10 milliGRAM(s) Oral daily  ascorbic acid 500 milliGRAM(s) Oral two times a day  aspirin enteric coated 81 milliGRAM(s) Oral daily  bisacodyl Suppository 10 milliGRAM(s) Rectal once  chlorhexidine 2% Cloths 1 Application(s) Topical daily  dextrose 5%. 1000 milliLiter(s) IV Continuous <Continuous>  dextrose 5%. 1000 milliLiter(s) IV Continuous <Continuous>  dextrose 50% Injectable 50 milliLiter(s) IV Push every 15 minutes  dextrose 50% Injectable 25 milliLiter(s) IV Push every 15 minutes  dextrose Oral Gel 15 Gram(s) Oral once PRN  gabapentin 100 milliGRAM(s) Oral every 8 hours  glucagon  Injectable 1 milliGRAM(s) IntraMuscular once  heparin  Infusion 1000 Unit(s)/Hr IV Continuous <Continuous>  HYDROmorphone   Tablet 4 milliGRAM(s) Oral every 3 hours PRN  HYDROmorphone  Injectable 0.5 milliGRAM(s) IV Push every 6 hours PRN  insulin lispro (ADMELOG) corrective regimen sliding scale   SubCutaneous three times a day before meals  insulin lispro (ADMELOG) corrective regimen sliding scale   SubCutaneous at bedtime  lidocaine   4% Patch 1 Patch Transdermal daily PRN  metoprolol tartrate 100 milliGRAM(s) Oral every 8 hours  naloxone Injectable 0.1 milliGRAM(s) IV Push every 3 minutes PRN  ondansetron Injectable 4 milliGRAM(s) IV Push every 6 hours PRN  oxyCODONE    IR 5 milliGRAM(s) Oral every 4 hours PRN  oxyCODONE    IR 10 milliGRAM(s) Oral every 4 hours PRN  pantoprazole    Tablet 40 milliGRAM(s) Oral before breakfast  polyethylene glycol 3350 17 Gram(s) Oral daily  senna 2 Tablet(s) Oral at bedtime  sodium chloride 0.9%. 1000 milliLiter(s) IV Continuous <Continuous>  sodium chloride 0.9%. 1000 milliLiter(s) IV Continuous <Continuous>    Allergies:  No Known Allergies    FAMILY HISTORY:    Social History:  Smoking: denies  Alcohol: denies  Drugs: denies    Review of Systems:  Constitutional: [ ] Fever [ ] Chills [ ] Fatigue [ ] Weight change   HEENT: [ ] Blurred vision [ ] Eye Pain [ ] Headache [ ] Runny nose [ ] Sore Throat   Respiratory: [ ] Cough [ ] Wheezing [ ] Shortness of breath  Cardiovascular: [ ] Chest Pain [ ] Palpitations [ ] LOPEZ [ ] PND [ ] Orthopnea  Gastrointestinal: [ ] Abdominal Pain [ ] Diarrhea [ ] Constipation [ ] Hemorrhoids [ ] Nausea [ ] Vomiting  Genitourinary: [ ] Nocturia [ ] Dysuria [ ] Incontinence  Extremities: [ ] Swelling [ ] Joint Pain  Neurologic: [ ] Focal deficit [ ] Paresthesias [ ] Syncope  Lymphatic: [ ] Swelling [ ] Lymphadenopathy   Skin: [ ] Rash [ ] Ecchymoses [ ] Wounds [ ] Lesions  Psychiatry: [ ] Depression [ ] Suicidal/Homicidal Ideation [ ] Anxiety [ ] Sleep Disturbances  [X ] 10 point review of systems is otherwise negative except as mentioned above            [ ]Unable to obtain    Physical Exam:  T(C): 36.7 (23 @ 19:22), Max: 36.7 (23 @ 11:07)  HR: 85 (23 @ 19:22) (59 - 97)  BP: 124/81 (23 @ 19:22) (106/63 - 133/77)  RR: 18 (23 @ 19:22) (18 - 18)  SpO2: 97% (03-17-23 @ 19:22) (94% - 97%)  Wt(kg): --     @ 07:01  -   @ 07:00  --------------------------------------------------------  IN: 1428.3 mL / OUT: 2465 mL / NET: -1036.7 mL     @ 07:01   @ 20:05  --------------------------------------------------------  IN: 940 mL / OUT: 1890 mL / NET: -950 mL      Daily     Daily Weight in k.4 (17 Mar 2023 06:37)    Appearance: NAD  Eyes: PERRL, EOMI  HENT: Normal oral mucosa, NC/AT  Cardiovascular: normal S1 and S2, irregular rhythm, no m/r/g, no edema, normal JVP +large vertical dressing at sternotomy site   Respiratory: Clear to auscultation bilaterally  Gastrointestinal: Soft, non-tender, non-distended, BS+  Musculoskeletal: No clubbing, no joint deformity   Neurologic: Non-focal  Lymphatic: No lymphadenopathy  Psychiatry: AAOx3, mood & affect appropriate  Skin: No rashes, no ecchymoses, no cyanosis    Cardiovascular Diagnostic Testing:    Echo:  FINDINGS:    Left Ventricle:  The left ventricle is normal in size, wall thickness, and systolic   function with a calculated ejection fraction of 55-60%. There are no   regional wall motion abnormalities seen. There is normal left ventricular   diastolic function and filling pressure.    Right Ventricle:  The right ventricle is normal in size. Right ventricular systolic   function is normal. The tricuspid annular plane systolic excursion   (TAPSE) is 26.00 mm (normal >=17 mm). RV tissue Doppler S' is 11.00 cm/s   (normal >10 cm/s).    Left Atrium:  The left atrium is normal in size. Left atrial volume index (MASOUD) is   26.9 ml/m².    Right Atrium:  The right atrium is normal in size.    Aortic Valve:  Aortic valve is not well visualized, but is probably bicuspid. No   hemodynamically significant aortic stenosis. The peak transvalvular   velocity is 2.40 m/s, the mean transvalvular gradient is 9.00 mmHg, and   the LVOT/AV velocity ratio is 0.78. The peak transaortic gradient is   23.04 mmHg. There is severe aortic regurgitation. The pressure half time   of aortic regurgitation is 684.00 ms.    Mitral Valve:  The mitral valve is mildly thickened. Systolic anterior motion of the   mitral valve is seen without evidence of LVOT obstruction. There is mild   mitral annular calcification. There is trace mitral regurgitation.    Tricuspid Valve:  Structurally normal tricuspid valve with normal leaflet excursion. There   is trace tricuspid regurgitation. Pulmonary artery systolic pressure   (estimated using the tricuspid regurgitant gradient and an estimate of   right atrial pressure) is 29 mmHg.    Inferior Vena Cava:  The inferior vena cava is normal in size (<2.1cm) with abnormal   inspiratory collapse (<50%) consistent with mildly elevated right atrial   pressure (  8, range 5-10mmHg).    Pulmonic Valve:  Structurally normal pulmonic valve with normal leaflet excursion. There   is trace pulmonic regurgitation.    Aorta:  The aortic root is normal in size. The aortic arch is normal without   evidence of aortic coarctation.    Pericardium:  No pericardial effusion is seen.    ---------------------------------------------------------------------------  -----  Carlos Veloz MD    Electronically signed by Carlos Veloz MD  Signature Date/Time: :02:51 PM      PROCEDURE: Intra operative transeophageal echocardiogram  with 2D, M mode and complete  Doppler examination.  The  transesophageal probe was atraumatically placed in the  esophagus after induction of anesthesia.  INDICATION: Nonrheumatic aortic (valve) insufficiency  (I35.1)  ------------------------------------------------------------------------  Dimensions:    Normal Values:  LA:            2.0 - 4.0 cm  Ao:            2.0 - 3.8 cm  SEPTUM:        0.6 - 1.2 cm  PWT:           0.6 - 1.1 cm  LVIDd:         3.0 - 5.6 cm  LVIDs:         1.8 - 4.0 cm  EF (Visual Estimate): 50-55 %  ------------------------------------------------------------------------  Pre-Bypass Observations:  Mitral Valve: Normal mitral valve. Minimal mitral  regurgitation.  Aortic Valve/Aorta: Bicuspid AV, with fusion of the R and L  cusps. Moderate-severe aorticregurgitation. Eccentric jet  Mildly effaced STJ. Otherwise normal aortic root. Normal  descending and ascending aorta.  Left Atrium: Normal left atrium. No clot in RAFAT  Left Ventricle: Normal left ventricular systolic function.  No segmental wall motion abnormalities. Mildly dilated LV  Right Heart: Normal right atrium. Normal right ventricular  size and function. Normal tricuspid valve. Normal pulmonic  valve.  Pericardium/Pleura: Normal pericardium with no pericardial  effusion.  ------------------------------------------------------------------------  Post-Bypass Observations:    S/p 27mm AVR Hemisternotomy.  On low dose levo and , 115/65 NSR.  No PVL or Central AI at the aortic position. Gradient =  mean of 10-12mmhg  LVEF mildly reduced, septal motion consistent with pacing.  Rest of exam unchanged.  D/w Dr. Silva  ------------------------------------------------------------------------  Conclusions:  1. Normal mitral valve. Minimal mitral regurgitation.  2. Bicuspid AV, with fusion of the R and L cusps.  Moderate-severe aortic regurgitation. Eccentric jet  3. Mildly effaced STJ. Otherwise normal aortic root. Normal  descending and ascending aorta.  4. Normal left atrium. No clot in RAFAT  5. Mildly dilated LV  6. Normal left ventricular systolic function. No segmental  wall motion abnormalities.  7. Normal right atrium.  8. Normal tricuspid valve.  9. Normal pericardium with no pericardial effusion.  All findings dw surgeon.  On no drips.  Scheduled for hemisternotomy AVR.  VS as above.  Confirmed on  3/14/2023 - 13:34:26 by Richar Ramírez MD        Imaging:    Labs:                        9.7    11.58 )-----------( 161      ( 17 Mar 2023 05:24 )             28.7     03-17    136  |  102  |  16  ----------------------------<  123<H>  4.1   |  24  |  0.78    Ca    8.4      17 Mar 2023 05:23  Phos  2.6     03-17  Mg     2.2     -17    TPro  5.9<L>  /  Alb  3.5  /  TBili  0.7  /  DBili  x   /  AST  19  /  ALT  19  /  AlkPhos  34<L>  03-17

## 2023-03-17 NOTE — CONSULT NOTE ADULT - ATTENDING COMMENTS
52M with a PMH of bicuspid aortic valve now s/p AVR course complicated by atrial fibrillation currently rate controlled. EP consulted for possible cardioversion. Cont AC. Converted to SR earlier today. Follow on tele.

## 2023-03-17 NOTE — PROGRESS NOTE ADULT - ASSESSMENT
51 yo M with PMHx bicuspid AV, severe AI,     3/14 s/p mini sternotomy, AVR-t, aortic root enlargement 2FFP, 10cryo, 1plt  3/16 Transferred to SDU overnight. + R rad A-line, +MEDx2, +RPx1, monitor output. keep chest tubes till day 3 per Dr. Silva.

## 2023-03-17 NOTE — PROGRESS NOTE ADULT - SUBJECTIVE AND OBJECTIVE BOX
VITAL SIGNS    Telemetry:  afib 50-90    Vital Signs Last 24 Hrs  T(C): 36.7 (23 @ 11:07), Max: 36.7 (23 @ 17:30)  T(F): 98 (23 @ 11:07), Max: 98 (23 @ 17:30)  HR: 70 (23 @ 11:07) (59 - 97)  BP: 117/69 (23 @ 07:00) (103/67 - 141/65)  RR: 18 (23 @ 11:07) (12 - 24)  SpO2: 94% (23 @ 11:07) (91% - 98%)                    07:01  -   @ 07:00  --------------------------------------------------------  IN: 1428.3 mL / OUT: 2465 mL / NET: -1036.7 mL     07:01  -   @ 11:53  --------------------------------------------------------  IN: 480 mL / OUT: 880 mL / NET: -400 mL          Daily     Daily Weight in k.4 (17 Mar 2023 06:37)            CAPILLARY BLOOD GLUCOSE      POCT Blood Glucose.: 122 mg/dL (17 Mar 2023 11:29)  POCT Blood Glucose.: 131 mg/dL (17 Mar 2023 07:40)  POCT Blood Glucose.: 125 mg/dL (16 Mar 2023 21:33)  POCT Blood Glucose.: 120 mg/dL (16 Mar 2023 17:10)  POCT Blood Glucose.: 120 mg/dL (16 Mar 2023 13:35)            Drains:      Pacing Wires        [  ]   Settings:                                  Isolated  [  ]    Coumadin    [ ] YES          [  x      NO                                   PHYSICAL EXAM        Neurology: alert and oriented x 3, nonfocal, no gross deficits  CV : s1 s2 RRR  R rad a line  Sternal Wound :  CDI , Stable  Lungs: cta  Abdomen: soft, nontender, nondistended, positive bowel sounds, last bowel movement                       chest tubes x 3  :    voiding        Extremities:    trace   edema   /  -   calve tenderness ,            acetaminophen     Tablet .. 650 milliGRAM(s) Oral every 6 hours PRN  amLODIPine   Tablet 10 milliGRAM(s) Oral daily  ascorbic acid 500 milliGRAM(s) Oral two times a day  aspirin enteric coated 81 milliGRAM(s) Oral daily  bisacodyl Suppository 10 milliGRAM(s) Rectal once  chlorhexidine 2% Cloths 1 Application(s) Topical daily  dextrose 5%. 1000 milliLiter(s) IV Continuous <Continuous>  dextrose 5%. 1000 milliLiter(s) IV Continuous <Continuous>  dextrose 50% Injectable 50 milliLiter(s) IV Push every 15 minutes  dextrose 50% Injectable 25 milliLiter(s) IV Push every 15 minutes  dextrose Oral Gel 15 Gram(s) Oral once PRN  enoxaparin Injectable 40 milliGRAM(s) SubCutaneous every 24 hours  gabapentin 100 milliGRAM(s) Oral every 8 hours  glucagon  Injectable 1 milliGRAM(s) IntraMuscular once  HYDROmorphone PCA (1 mG/mL) 30 milliLiter(s) PCA Continuous PCA Continuous  HYDROmorphone PCA (1 mG/mL) Rescue Clinician Bolus 0.5 milliGRAM(s) IV Push every 15 minutes PRN  insulin lispro (ADMELOG) corrective regimen sliding scale   SubCutaneous three times a day before meals  insulin lispro (ADMELOG) corrective regimen sliding scale   SubCutaneous at bedtime  lidocaine   4% Patch 1 Patch Transdermal daily PRN  metoprolol tartrate 100 milliGRAM(s) Oral every 8 hours  naloxone Injectable 0.1 milliGRAM(s) IV Push every 3 minutes PRN  ondansetron Injectable 4 milliGRAM(s) IV Push every 6 hours PRN  oxyCODONE    IR 5 milliGRAM(s) Oral every 4 hours PRN  oxyCODONE    IR 10 milliGRAM(s) Oral every 4 hours PRN  pantoprazole    Tablet 40 milliGRAM(s) Oral before breakfast  polyethylene glycol 3350 17 Gram(s) Oral daily  senna 2 Tablet(s) Oral at bedtime  sodium chloride 0.9%. 1000 milliLiter(s) IV Continuous <Continuous>  sodium chloride 0.9%. 1000 milliLiter(s) IV Continuous <Continuous>                    Physical Therapy Rec:   Home  [  ]   Home w/ PT  [  ]  Rehab  [  ]  Discussed with Cardiothoracic Team at AM rounds.

## 2023-03-17 NOTE — PROGRESS NOTE ADULT - SUBJECTIVE AND OBJECTIVE BOX
Day 3 of Anesthesia Pain Management Service    SUBJECTIVE: I'm doing ok    Pain Scale Score:	[X] Refer to charted pain scores    THERAPY:    [ ] IV PCA Morphine		[ ] 5 mg/mL	[ ] 1 mg/mL  [X] IV PCA Hydromorphone	[ ] 5 mg/mL	[X] 1 mg/mL  [ ] IV PCA Fentanyl		[ ] 50 micrograms/mL    Demand dose: 0.2 mg     Lockout: 6 minutes   Continuous Rate: 0 mg/hr  4 Hour Limit: 4 mg    MEDICATIONS  (STANDING):  acetaminophen     Tablet .. 650 milliGRAM(s) Oral every 6 hours  amLODIPine   Tablet 10 milliGRAM(s) Oral daily  ascorbic acid 500 milliGRAM(s) Oral two times a day  aspirin enteric coated 81 milliGRAM(s) Oral daily  bisacodyl Suppository 10 milliGRAM(s) Rectal once  chlorhexidine 2% Cloths 1 Application(s) Topical daily  dextrose 5%. 1000 milliLiter(s) (50 mL/Hr) IV Continuous <Continuous>  dextrose 5%. 1000 milliLiter(s) (100 mL/Hr) IV Continuous <Continuous>  dextrose 50% Injectable 50 milliLiter(s) IV Push every 15 minutes  dextrose 50% Injectable 25 milliLiter(s) IV Push every 15 minutes  enoxaparin Injectable 40 milliGRAM(s) SubCutaneous every 24 hours  gabapentin 100 milliGRAM(s) Oral every 8 hours  glucagon  Injectable 1 milliGRAM(s) IntraMuscular once  HYDROmorphone PCA (1 mG/mL) 30 milliLiter(s) PCA Continuous PCA Continuous  insulin lispro (ADMELOG) corrective regimen sliding scale   SubCutaneous three times a day before meals  insulin lispro (ADMELOG) corrective regimen sliding scale   SubCutaneous at bedtime  metoprolol tartrate 100 milliGRAM(s) Oral every 8 hours  pantoprazole    Tablet 40 milliGRAM(s) Oral before breakfast  polyethylene glycol 3350 17 Gram(s) Oral daily  senna 2 Tablet(s) Oral at bedtime  sodium chloride 0.9%. 1000 milliLiter(s) (10 mL/Hr) IV Continuous <Continuous>  sodium chloride 0.9%. 1000 milliLiter(s) (30 mL/Hr) IV Continuous <Continuous>    MEDICATIONS  (PRN):  acetaminophen     Tablet .. 650 milliGRAM(s) Oral every 6 hours PRN Mild Pain (1 - 3)  dextrose Oral Gel 15 Gram(s) Oral once PRN Blood Glucose LESS THAN 70 milliGRAM(s)/deciliter  HYDROmorphone PCA (1 mG/mL) Rescue Clinician Bolus 0.5 milliGRAM(s) IV Push every 15 minutes PRN for Pain Scale GREATER THAN 6  lidocaine   4% Patch 1 Patch Transdermal daily PRN pain at surgical site  naloxone Injectable 0.1 milliGRAM(s) IV Push every 3 minutes PRN For ANY of the following changes in patient status:  A. RR LESS THAN 10 breaths per minute, B. Oxygen saturation LESS THAN 90%, C. Sedation score of 6  ondansetron Injectable 4 milliGRAM(s) IV Push every 6 hours PRN Nausea  oxyCODONE    IR 5 milliGRAM(s) Oral every 4 hours PRN Moderate Pain (4 - 6)  oxyCODONE    IR 10 milliGRAM(s) Oral every 4 hours PRN Severe Pain (7 - 10)      OBJECTIVE:    Sedation Score:	[ X] Alert 	[ ] Drowsy 	[ ] Arousable	[ ] Asleep	[ ] Unresponsive    Side Effects:	[X ] None	[ ] Nausea	[ ] Vomiting	[ ] Pruritus  		[ ] Other:    Vital Signs Last 24 Hrs  T(C): 36.4 (17 Mar 2023 07:00), Max: 37.4 (16 Mar 2023 11:00)  T(F): 97.5 (17 Mar 2023 07:00), Max: 99.3 (16 Mar 2023 11:00)  HR: 74 (17 Mar 2023 07:00) (59 - 97)  BP: 117/69 (17 Mar 2023 07:00) (103/67 - 141/65)  BP(mean): 88 (17 Mar 2023 07:00) (78 - 97)  RR: 18 (17 Mar 2023 07:00) (12 - 26)  SpO2: 96% (17 Mar 2023 07:00) (91% - 98%)    Parameters below as of 17 Mar 2023 07:00  Patient On (Oxygen Delivery Method): nasal cannula  O2 Flow (L/min): 4      ASSESSMENT/ PLAN    Therapy to  be:               [X] Continued   [ ] Discontinued   [ ] Changed to PRN Analgesics    Documentation and Verification of current medications:   [X] Done	[ ] Not done, not eligible    Comments: OOB in chair. PCA use limited. Transition to prn analgesics once CTs D\C'd

## 2023-03-17 NOTE — PROGRESS NOTE ADULT - SUBJECTIVE AND OBJECTIVE BOX
Subjective: Patient states "Hello"    VITAL SIGNS    Telemetry: Afib      Vital Signs Last 24 Hrs  T(C): 36.6 (03-16-23 @ 20:00), Max: 37.4 (03-16-23 @ 11:00)  T(F): 97.9 (03-16-23 @ 20:00), Max: 99.3 (03-16-23 @ 11:00)  HR: 97 (03-16-23 @ 23:00) (61 - 97)  BP: 133/75 (03-16-23 @ 23:00) (103/67 - 141/65)  RR: 21 (03-16-23 @ 19:00) (12 - 26)  SpO2: 94% (03-16-23 @ 23:00) (90% - 99%)            03-15 @ 07:01  -  03-16 @ 07:00  --------------------------------------------------------  IN: 4338.2 mL / OUT: 5195 mL / NET: -856.8 mL    03-16 @ 07:01  -  03-17 @ 01:07  --------------------------------------------------------  IN: 1188.3 mL / OUT: 2315 mL / NET: -1126.7 mL    Daily   Admit Wt: Drug Dosing Weight  Height (cm): 198.1 (14 Mar 2023 07:44)  Weight (kg): 117.6 (14 Mar 2023 07:44)  BMI (kg/m2): 30 (14 Mar 2023 07:44)  BSA (m2): 2.52 (14 Mar 2023 07:44)      CAPILLARY BLOOD GLUCOSE  POCT Blood Glucose.: 125 mg/dL (16 Mar 2023 21:33)  POCT Blood Glucose.: 120 mg/dL (16 Mar 2023 17:10)  POCT Blood Glucose.: 120 mg/dL (16 Mar 2023 13:35)  POCT Blood Glucose.: 209 mg/dL (16 Mar 2023 06:30)        acetaminophen     Tablet .. 650 milliGRAM(s) Oral every 6 hours  acetaminophen     Tablet .. 650 milliGRAM(s) Oral every 6 hours PRN  aMIOdarone    Tablet 400 milliGRAM(s) Oral two times a day  amLODIPine   Tablet 10 milliGRAM(s) Oral daily  ascorbic acid 500 milliGRAM(s) Oral two times a day  aspirin enteric coated 81 milliGRAM(s) Oral daily  bisacodyl Suppository 10 milliGRAM(s) Rectal once  chlorhexidine 2% Cloths 1 Application(s) Topical daily  dextrose 5%. 1000 milliLiter(s) IV Continuous <Continuous>  dextrose 5%. 1000 milliLiter(s) IV Continuous <Continuous>  dextrose 50% Injectable 50 milliLiter(s) IV Push every 15 minutes  dextrose 50% Injectable 25 milliLiter(s) IV Push every 15 minutes  dextrose Oral Gel 15 Gram(s) Oral once PRN  enoxaparin Injectable 40 milliGRAM(s) SubCutaneous every 24 hours  gabapentin 100 milliGRAM(s) Oral every 8 hours  glucagon  Injectable 1 milliGRAM(s) IntraMuscular once  HYDROmorphone PCA (1 mG/mL) 30 milliLiter(s) PCA Continuous PCA Continuous  HYDROmorphone PCA (1 mG/mL) Rescue Clinician Bolus 0.5 milliGRAM(s) IV Push every 15 minutes PRN  insulin lispro (ADMELOG) corrective regimen sliding scale   SubCutaneous three times a day before meals  insulin lispro (ADMELOG) corrective regimen sliding scale   SubCutaneous at bedtime  lidocaine   4% Patch 1 Patch Transdermal daily PRN  metoprolol tartrate 100 milliGRAM(s) Oral every 8 hours  naloxone Injectable 0.1 milliGRAM(s) IV Push every 3 minutes PRN  ondansetron Injectable 4 milliGRAM(s) IV Push every 6 hours PRN  oxyCODONE    IR 5 milliGRAM(s) Oral every 4 hours PRN  oxyCODONE    IR 10 milliGRAM(s) Oral every 4 hours PRN  pantoprazole    Tablet 40 milliGRAM(s) Oral before breakfast  polyethylene glycol 3350 17 Gram(s) Oral daily  senna 2 Tablet(s) Oral at bedtime  sodium chloride 0.9%. 1000 milliLiter(s) IV Continuous <Continuous>  sodium chloride 0.9%. 1000 milliLiter(s) IV Continuous <Continuous>               9.6    11.80 )-----------( 118      ( 16 Mar 2023 00:30 )             27.7     03-16    138  |  104  |  13  ----------------------------<  121<H>  4.0   |  25  |  0.76    Ca    8.6      16 Mar 2023 00:30  Phos  2.0     03-16  Mg     2.1     03-16    TPro  5.8<L>  /  Alb  4.1  /  TBili  0.8  /  DBili  x   /  AST  29  /  ALT  23  /  AlkPhos  29<L>  03-16    PHYSICAL EXAM  Neurology: A&Ox3, NAD  CV : +S1S2  + PW box off   Sternal Wound: MSI dressing CDI , Stable  + MED x 2 serosanguinous RPx1 serosanguinous   Lungs: Respirations non-labored, B/L clear  Abdomen: Soft, NT/ND, +BSx4Q, needs BM post op (-)N/V/D  : Voiding   Extremities: + trace B/L LE edema, negative calf tenderness, +PP B/L, + R rad lisbet      Disposition:  Home [   ]     Rehab [   ]       OR Date:

## 2023-03-17 NOTE — OCCUPATIONAL THERAPY INITIAL EVALUATION ADULT - GENERAL OBSERVATIONS, REHAB EVAL
Recv'd sitting in chair with NAD, +Cardiac monitor, +HFNC, +A-line, +IV, +pulse ox,  +external pacer, +chest tube x3

## 2023-03-17 NOTE — OCCUPATIONAL THERAPY INITIAL EVALUATION ADULT - ADDITIONAL COMMENTS
Pt reports he lives in a private house with spouse, 2 JOEL, [place] house, Stall shower PTA pt was independent in all adl's and functional mobility

## 2023-03-17 NOTE — OCCUPATIONAL THERAPY INITIAL EVALUATION ADULT - FINE MOTOR COORDINATION, LEFT HAND THUMB/FINGER OPPOSITION SKILLS, OT EVAL
D/C Plan: Return to [facility]    Noted pt is not medically stable to transition to [place] [address] today. [place] has indicated they may be able to accept this pt Sunday if medically stable. Please contact CM on call, if pt is medically stable to transition Sunday to verify pt is able to return. Pt is a LTC resident at [place] [address]. Anticipate plan return to [facility] when medically stable. Transition of care to SNF: [facility]     Communication to Patient/Family:  Met with patient and family and they are agreeable to the transition plan. The Plan for Transition of Care is related to the following treatment goals: LTC    The Patient and/or patient representative was provided with a choice of provider and agrees   with the discharge plan. Yes [x] No []    Freedom of choice list was provided with basic dialogue that supports the patient's individualized plan of care/goals and shares the quality data associated with the providers. Yes [x] No []    SNF/Rehab Transition:  Patient has been accepted [facility] [address] [facility] for SNF/Rehab and meets criteria for admission. Patient will transported by medical transport and expected to leave when medically stable. Communication to SNF/Rehab:  Bedside RN, has been notified to update the transition plan to the facility and call report [phone]. Discharge information has been updated on the [facility] and communicated via [facility] and/or CC link. Discharge instructions to be fax'd to facility at [phone] per request.     Please include all hard scripts for controlled substances, med rec and dc summary in packet. Please medicate for pain prior to dc if possible and needed to help offset delay when patient first arrives to facility.     Reviewed and confirmed with facility, Dimas FINNEY can manage the patient care needs for the following:     Sergio Bernardo with (X) only those applicable:  Medication:  []Medications are available at the facility  []IV Antibiotics    []Controlled Substance  hard copies available sent. []Weekly Labs    Equipment:  []CPAP/BiPAP  []Wound Vacuum  []Ragland or Urinary Device  []PICC/Central Line  []Nebulizer  []Ventilator    Treatment:  []Isolation (for MRSA, VRE, etc.)  []Surgical Drain Management  []Tracheostomy Care  []Dressing Changes  []Dialysis with transportation  []PEG Care  []Oxygen  []Daily Weights for Heart Failure    Dietary:  []Any diet limitations  []Tube Feedings   []Total Parenteral Management (TPN)    Financial Resources:  []Medicaid Application Completed    []UAI Completed  and copy given to pt/family    []A screening has previously been completed. []Level II Completed    [] Private pay individual who will not become   financially eligible for Medicaid within 6 months from admission to a [address] [place] [address]. [] Individual refused to have screening conducted. []Medicaid Application Completed    []The screening denied because it was determined individual did not need/did not qualify for nursing facility level of care. [] Out of state residents seeking direct admission to a [address] facility. [] Individuals who are inpatients of an out of state hospital, or in state or out of state veterans/ hospital and seek direct admission to a [address] facility  [] Individuals who are pateints or residents of a state owned/operated facility that is licensed by Department of [employer] ([employer]) and seek direct admission to [address] [place] [address]  [] A screening not required for enrollment in [address] [place] [address] [address] services as set out in [address] [place] [address] 85-  [] [facility]) staff shall perform screenings of the [facility] clients. Advanced Care Plan:  []Surrogate Decision Maker of Care  []POA  []Communicated Code Status and copy sent.     Other:           Care Management Interventions  Mode of Transport at Discharge: BLS  Transition of Care Consult (CM Consult): Discharge Planning, Long Term Care (from [place] [address])  Health Maintenance Reviewed: Yes  Physical Therapy Consult: Yes  Occupational Therapy Consult: Yes  Current Support Network: [address] [place] [address]  The Plan for Transition of Care is Related to the Following Treatment Goals : Return to [facility]  The Patient and/or Patient Representative was Provided with a Choice of Provider and Agrees with the Discharge Plan?: Yes  Name of the Patient Representative Who was Provided with a Choice of Provider and Agrees with the Discharge Plan: pt/family  Freedom of Choice List was Provided with Basic Dialogue that Supports the Patient's Individualized Plan of Care/Goals, Treatment Preferences and Shares the Quality Data Associated with the Providers?: Yes  Discharge Location  Discharge Placement: Skilled nursing facility ([facility]) normal performance

## 2023-03-17 NOTE — OCCUPATIONAL THERAPY INITIAL EVALUATION ADULT - PERTINENT HX OF CURRENT PROBLEM, REHAB EVAL
53 yo M with PMHx bicuspid AV, severe AI, now s/p mini sternotomy, AVR-t, aortic root enlargement on 3/14/23. no other pending tests

## 2023-03-17 NOTE — PROGRESS NOTE ADULT - ASSESSMENT
51 yo M with PMHx bicuspid AV, severe AI,     3/14 s/p mini sternotomy, AVR-t, aortic root enlargement 2FFP, 10cryo, 1plt  3/16 Transferred to SDU overnight. + R rad A-line, +MEDx2, +RPx1, monitor output. keep chest tubes till day 3 per Dr. Silva.   3/17   D/c ct's as drainage decreases.  Wean off pca, oral opioids

## 2023-03-17 NOTE — CONSULT NOTE ADULT - ASSESSMENT
Note not final until signed by attending       Renetta Back MD  Cardiology Fellow PGY-5  Phone: [phone]    For all New Consults  [email or web address]   Login: [email or web address]   Mr. Witt is a 52M with a PMH of bicuspid aortic valve now s/p AVR course complicated by atrial fibrillation currently rate controlled. EP consulted for possible cardioversion.      1. Atrial Fibrillation - CHADSVASC = 0 currently HR in 80s at bedside asymptomatic new onset 2/2 to recent cardiac surgery MOUNIKA on 3/14 showed no RAFAT thrombus LA size is normal LVEF 55-60%   - Currently rate controlled with Metoprolol Tartrate 100mg Q8H  - Also started on 5g PO amio load ideally long-term given its side effect profile should be avoided  - Would keep patient NPO will discuss role for cardioversion with EP team in the AM  - Goal target HR < 100  - Maintain T&S x 2   - Keep Mg > 2 and K > 4    Note not final until signed by attending       Renetta Back MD  Cardiology Fellow PGY-5  Phone: [phone]    For all New Consults  [email or web address]   Login: [email or web address]   Mr. Witt is a 52M with a PMH of bicuspid aortic valve now s/p AVR course complicated by atrial fibrillation currently rate controlled. EP consulted for possible cardioversion.      1. Atrial Fibrillation - CHADSVASC = 0 currently HR in 80s at bedside asymptomatic new onset 2/2 to recent cardiac surgery MOUNIKA on 3/14 showed no RAFAT thrombus LA size is normal LVEF 55-60%   - Agree with AC for now given plan for possible cardioversion   - Currently rate controlled with Metoprolol Tartrate 100mg Q8H  - Also started on 5g PO amio load ideally long-term given its side effect profile should be avoided  - Would keep patient NPO will discuss role for cardioversion with EP team in the AM  - Goal target HR < 100  - Maintain T&S x 2   - Keep Mg > 2 and K > 4    Note not final until signed by attending       Renetta Back MD  Cardiology Fellow PGY-5  Phone: [phone]    For all New Consults  [email or web address]   Login: eva   Mr. Witt is a 52M with a PMH of bicuspid aortic valve now s/p AVR course complicated by atrial fibrillation currently rate controlled. EP consulted for possible cardioversion.      1. Atrial Fibrillation - CHADSVASC = 0 currently HR in 80s at bedside asymptomatic new onset 2/2 to recent cardiac surgery MOUNIKA on 3/14 showed no RAFAT thrombus LA size is normal LVEF 55-60%   - Agree with AC for now given plan for possible cardioversion   - Currently rate controlled with Metoprolol Tartrate 100mg Q8H  - Also started on 5g PO amio load ideally long-term given its side effect profile should be avoided  - Would keep patient NPO will discuss role for cardioversion with EP team in the AM  - Goal target HR < 100  - Check TSH  - Maintain T&S x 2   - Keep Mg > 2 and K > 4    Note not final until signed by attending       Renetta Back MD  Cardiology Fellow PGY-5  Phone: [phone]    For all New Consults  [email or web address]   Login: eva   Mr. Witt is a 52M with a PMH of bicuspid aortic valve now s/p AVR course complicated by atrial fibrillation currently rate controlled. EP consulted for possible cardioversion.      1. Atrial Fibrillation - CHADSVASC = 0 currently HR in 80s at bedside asymptomatic new onset 2/2 to recent cardiac surgery MOUNIKA on 3/14 showed no RAFAT thrombus LA size is normal LVEF 55-60%   - Agree with AC for now given plan for possible cardioversion   - Currently rate controlled with Metoprolol Tartrate 100mg Q8H  - Also started on 5g PO amio load ideally long-term given its side effect profile should be avoided  - Plan for MOUNIKA/DCCV on Monday, please make sure patient is NPO after MN  - Goal target HR < 100  - Check TSH  - Maintain T&S x 2   - Keep Mg > 2 and K > 4    Note not final until signed by attending       Renetta Back MD  Cardiology Fellow PGY-5  Phone: [phone]    For all New Consults  [email or web address]   Login: eva

## 2023-03-17 NOTE — PROGRESS NOTE ADULT - PROBLEM SELECTOR PLAN 1
s/p AVR-t, aortic root enlargement   + R rad A-line,   +MEDx2, +RPx1,   c/w ASA 81QD,  c/w amlodipine 10mg QD  c/w lopressor 100mg Q8  + PCA pump  monitor output. keep chest tubes till day 3 per Dr. Silva.   Cough and deep breathe, Incentive Spirometry Q1h, Chest PT, Pulm Toilet  Ambulate 3x daily as tolerated and with PT  C/W GI prophylaxis  DVT prophylaxis

## 2023-03-18 DIAGNOSIS — I48.91 UNSPECIFIED ATRIAL FIBRILLATION: ICD-10-CM

## 2023-03-18 LAB
ALBUMIN SERPL ELPH-MCNC: 3.8 G/DL — SIGNIFICANT CHANGE UP (ref 3.3–5)
ALP SERPL-CCNC: 43 U/L — SIGNIFICANT CHANGE UP (ref 40–120)
ALT FLD-CCNC: 19 U/L — SIGNIFICANT CHANGE UP (ref 10–45)
ANION GAP SERPL CALC-SCNC: 13 MMOL/L — SIGNIFICANT CHANGE UP (ref 5–17)
APTT BLD: 34.6 SEC — SIGNIFICANT CHANGE UP (ref 27.5–35.5)
APTT BLD: 35.1 SEC — SIGNIFICANT CHANGE UP (ref 27.5–35.5)
APTT BLD: 41.5 SEC — HIGH (ref 27.5–35.5)
APTT BLD: 47.6 SEC — HIGH (ref 27.5–35.5)
AST SERPL-CCNC: 18 U/L — SIGNIFICANT CHANGE UP (ref 10–40)
BILIRUB SERPL-MCNC: 0.7 MG/DL — SIGNIFICANT CHANGE UP (ref 0.2–1.2)
BLD GP AB SCN SERPL QL: NEGATIVE — SIGNIFICANT CHANGE UP
BUN SERPL-MCNC: 14 MG/DL — SIGNIFICANT CHANGE UP (ref 7–23)
CALCIUM SERPL-MCNC: 9.2 MG/DL — SIGNIFICANT CHANGE UP (ref 8.4–10.5)
CHLORIDE SERPL-SCNC: 103 MMOL/L — SIGNIFICANT CHANGE UP (ref 96–108)
CO2 SERPL-SCNC: 22 MMOL/L — SIGNIFICANT CHANGE UP (ref 22–31)
CREAT SERPL-MCNC: 0.76 MG/DL — SIGNIFICANT CHANGE UP (ref 0.5–1.3)
EGFR: 108 ML/MIN/1.73M2 — SIGNIFICANT CHANGE UP
GLUCOSE BLDC GLUCOMTR-MCNC: 122 MG/DL — HIGH (ref 70–99)
GLUCOSE BLDC GLUCOMTR-MCNC: 167 MG/DL — HIGH (ref 70–99)
GLUCOSE SERPL-MCNC: 121 MG/DL — HIGH (ref 70–99)
HCT VFR BLD CALC: 28 % — LOW (ref 39–50)
HCT VFR BLD CALC: 34.4 % — LOW (ref 39–50)
HGB BLD-MCNC: 11.6 G/DL — LOW (ref 13–17)
HGB BLD-MCNC: 9.6 G/DL — LOW (ref 13–17)
INR BLD: 1.19 RATIO — HIGH (ref 0.88–1.16)
MAGNESIUM SERPL-MCNC: 2.2 MG/DL — SIGNIFICANT CHANGE UP (ref 1.6–2.6)
MCHC RBC-ENTMCNC: 30.6 PG — SIGNIFICANT CHANGE UP (ref 27–34)
MCHC RBC-ENTMCNC: 30.7 PG — SIGNIFICANT CHANGE UP (ref 27–34)
MCHC RBC-ENTMCNC: 33.7 GM/DL — SIGNIFICANT CHANGE UP (ref 32–36)
MCHC RBC-ENTMCNC: 34.3 GM/DL — SIGNIFICANT CHANGE UP (ref 32–36)
MCV RBC AUTO: 89.2 FL — SIGNIFICANT CHANGE UP (ref 80–100)
MCV RBC AUTO: 91 FL — SIGNIFICANT CHANGE UP (ref 80–100)
NRBC # BLD: 0 /100 WBCS — SIGNIFICANT CHANGE UP (ref 0–0)
NRBC # BLD: 0 /100 WBCS — SIGNIFICANT CHANGE UP (ref 0–0)
PLATELET # BLD AUTO: 195 K/UL — SIGNIFICANT CHANGE UP (ref 150–400)
PLATELET # BLD AUTO: 236 K/UL — SIGNIFICANT CHANGE UP (ref 150–400)
POTASSIUM SERPL-MCNC: 4.1 MMOL/L — SIGNIFICANT CHANGE UP (ref 3.5–5.3)
POTASSIUM SERPL-SCNC: 4.1 MMOL/L — SIGNIFICANT CHANGE UP (ref 3.5–5.3)
PROT SERPL-MCNC: 6.6 G/DL — SIGNIFICANT CHANGE UP (ref 6–8.3)
PROTHROM AB SERPL-ACNC: 13.8 SEC — HIGH (ref 10.5–13.4)
RBC # BLD: 3.14 M/UL — LOW (ref 4.2–5.8)
RBC # BLD: 3.78 M/UL — LOW (ref 4.2–5.8)
RBC # FLD: 12.5 % — SIGNIFICANT CHANGE UP (ref 10.3–14.5)
RBC # FLD: 12.6 % — SIGNIFICANT CHANGE UP (ref 10.3–14.5)
RH IG SCN BLD-IMP: POSITIVE — SIGNIFICANT CHANGE UP
SARS-COV-2 RNA SPEC QL NAA+PROBE: SIGNIFICANT CHANGE UP
SODIUM SERPL-SCNC: 138 MMOL/L — SIGNIFICANT CHANGE UP (ref 135–145)
UFH PPP CHRO-ACNC: 0.25 IU/ML — LOW (ref 0.3–0.7)
WBC # BLD: 8.84 K/UL — SIGNIFICANT CHANGE UP (ref 3.8–10.5)
WBC # BLD: 9.94 K/UL — SIGNIFICANT CHANGE UP (ref 3.8–10.5)
WBC # FLD AUTO: 8.84 K/UL — SIGNIFICANT CHANGE UP (ref 3.8–10.5)
WBC # FLD AUTO: 9.94 K/UL — SIGNIFICANT CHANGE UP (ref 3.8–10.5)

## 2023-03-18 PROCEDURE — 93010 ELECTROCARDIOGRAM REPORT: CPT

## 2023-03-18 PROCEDURE — 99233 SBSQ HOSP IP/OBS HIGH 50: CPT

## 2023-03-18 PROCEDURE — 71045 X-RAY EXAM CHEST 1 VIEW: CPT | Mod: 26

## 2023-03-18 RX ORDER — MAGNESIUM SULFATE 500 MG/ML
1 VIAL (ML) INJECTION ONCE
Refills: 0 | Status: COMPLETED | OUTPATIENT
Start: 2023-03-18 | End: 2023-03-18

## 2023-03-18 RX ORDER — HEPARIN SODIUM 5000 [USP'U]/ML
1400 INJECTION INTRAVENOUS; SUBCUTANEOUS
Qty: 25000 | Refills: 0 | Status: DISCONTINUED | OUTPATIENT
Start: 2023-03-18 | End: 2023-03-20

## 2023-03-18 RX ORDER — CALCIUM CARBONATE 500(1250)
1 TABLET ORAL
Refills: 0 | Status: DISCONTINUED | OUTPATIENT
Start: 2023-03-18 | End: 2023-03-22

## 2023-03-18 RX ORDER — POTASSIUM CHLORIDE 20 MEQ
20 PACKET (EA) ORAL ONCE
Refills: 0 | Status: COMPLETED | OUTPATIENT
Start: 2023-03-18 | End: 2023-03-18

## 2023-03-18 RX ADMIN — Medication 100 MILLIGRAM(S): at 05:38

## 2023-03-18 RX ADMIN — Medication 500 MILLIGRAM(S): at 05:39

## 2023-03-18 RX ADMIN — GABAPENTIN 100 MILLIGRAM(S): 400 CAPSULE ORAL at 21:52

## 2023-03-18 RX ADMIN — OXYCODONE HYDROCHLORIDE 5 MILLIGRAM(S): 5 TABLET ORAL at 21:57

## 2023-03-18 RX ADMIN — HEPARIN SODIUM 12 UNIT(S)/HR: 5000 INJECTION INTRAVENOUS; SUBCUTANEOUS at 09:03

## 2023-03-18 RX ADMIN — AMIODARONE HYDROCHLORIDE 400 MILLIGRAM(S): 400 TABLET ORAL at 12:43

## 2023-03-18 RX ADMIN — OXYCODONE HYDROCHLORIDE 5 MILLIGRAM(S): 5 TABLET ORAL at 17:24

## 2023-03-18 RX ADMIN — AMIODARONE HYDROCHLORIDE 400 MILLIGRAM(S): 400 TABLET ORAL at 05:39

## 2023-03-18 RX ADMIN — OXYCODONE HYDROCHLORIDE 10 MILLIGRAM(S): 5 TABLET ORAL at 12:43

## 2023-03-18 RX ADMIN — Medication 100 MILLIGRAM(S): at 21:52

## 2023-03-18 RX ADMIN — Medication 100 MILLIGRAM(S): at 14:07

## 2023-03-18 RX ADMIN — Medication 500 MILLIGRAM(S): at 17:26

## 2023-03-18 RX ADMIN — OXYCODONE HYDROCHLORIDE 5 MILLIGRAM(S): 5 TABLET ORAL at 10:35

## 2023-03-18 RX ADMIN — OXYCODONE HYDROCHLORIDE 5 MILLIGRAM(S): 5 TABLET ORAL at 18:20

## 2023-03-18 RX ADMIN — OXYCODONE HYDROCHLORIDE 5 MILLIGRAM(S): 5 TABLET ORAL at 09:44

## 2023-03-18 RX ADMIN — AMIODARONE HYDROCHLORIDE 400 MILLIGRAM(S): 400 TABLET ORAL at 21:53

## 2023-03-18 RX ADMIN — GABAPENTIN 100 MILLIGRAM(S): 400 CAPSULE ORAL at 05:39

## 2023-03-18 RX ADMIN — Medication 81 MILLIGRAM(S): at 09:45

## 2023-03-18 RX ADMIN — HEPARIN SODIUM 14 UNIT(S)/HR: 5000 INJECTION INTRAVENOUS; SUBCUTANEOUS at 17:27

## 2023-03-18 RX ADMIN — PANTOPRAZOLE SODIUM 40 MILLIGRAM(S): 20 TABLET, DELAYED RELEASE ORAL at 05:38

## 2023-03-18 RX ADMIN — HYDROMORPHONE HYDROCHLORIDE 4 MILLIGRAM(S): 2 INJECTION INTRAMUSCULAR; INTRAVENOUS; SUBCUTANEOUS at 03:44

## 2023-03-18 RX ADMIN — POLYETHYLENE GLYCOL 3350 17 GRAM(S): 17 POWDER, FOR SOLUTION ORAL at 09:44

## 2023-03-18 RX ADMIN — OXYCODONE HYDROCHLORIDE 5 MILLIGRAM(S): 5 TABLET ORAL at 22:30

## 2023-03-18 RX ADMIN — Medication 20 MILLIEQUIVALENT(S): at 09:44

## 2023-03-18 RX ADMIN — AMLODIPINE BESYLATE 10 MILLIGRAM(S): 2.5 TABLET ORAL at 05:39

## 2023-03-18 RX ADMIN — HYDROMORPHONE HYDROCHLORIDE 4 MILLIGRAM(S): 2 INJECTION INTRAMUSCULAR; INTRAVENOUS; SUBCUTANEOUS at 03:12

## 2023-03-18 RX ADMIN — CHLORHEXIDINE GLUCONATE 1 APPLICATION(S): 213 SOLUTION TOPICAL at 21:50

## 2023-03-18 RX ADMIN — Medication 100 GRAM(S): at 14:07

## 2023-03-18 RX ADMIN — SENNA PLUS 2 TABLET(S): 8.6 TABLET ORAL at 21:52

## 2023-03-18 RX ADMIN — GABAPENTIN 100 MILLIGRAM(S): 400 CAPSULE ORAL at 13:01

## 2023-03-18 RX ADMIN — OXYCODONE HYDROCHLORIDE 10 MILLIGRAM(S): 5 TABLET ORAL at 13:30

## 2023-03-18 NOTE — PROGRESS NOTE ADULT - PROBLEM SELECTOR PLAN 1
s/p AVR-t, aortic root enlargement     +MEDx2, +RPx1,   c/w ASA 81QD,  c/w amlodipine 10mg QD  c/w lopressor 100mg Q8    monitor output.of chest tube s/p AVR-t, aortic root enlargement   RPx1,   c/w ASA 81QD,  c/w amlodipine 10mg QD  c/w lopressor 100mg Q8   amio 400 q12    monitor output.of chest tube

## 2023-03-18 NOTE — PROGRESS NOTE ADULT - ASSESSMENT
51 yo M with PMHx bicuspid AV, severe AI,     3/14 s/p mini sternotomy, AVR-t, aortic root enlargement 2FFP, 10cryo, 1plt  3/16 Transferred to SDU overnight. + R rad A-line, +MEDx2, +RPx1, monitor output. keep chest tubes till day 3 per Dr. Silva.   3/17   D/c ct's as drainage decreases.  Wean off pca, oral opioids           51 yo M with PMHx bicuspid AV, severe AI,     3/14 s/p mini sternotomy, AVR-t, aortic root enlargement 2FFP, 10cryo, 1plt  3/16 Transferred to SDU overnight. + R rad A-line, +MEDx2, +RPx1, monitor output. keep chest tubes till day 3 per Dr. Silva.   3/17   D/c ct's as drainage decreases.  Wean off pca, oral opioids    3/18   rt pl tube for drainage     lop 100 q8   amio 400 q8   EP following  in afib  90   perc for incisional pain

## 2023-03-18 NOTE — PROGRESS NOTE ADULT - SUBJECTIVE AND OBJECTIVE BOX
VITAL SIGNS    Telemetry:      Vital Signs Last 24 Hrs  T(C): 36.7 (23 @ 03:00), Max: 36.7 (23 @ 11:07)  T(F): 98.1 (23 @ 03:00), Max: 98.1 (23 @ 22:54)  HR: 66 (23 @ 07:05) (63 - 85)  BP: 126/85 (23 @ 07:05) (121/77 - 127/62)  RR: 18 (23 @ 07:05) (18 - 18)  SpO2: 94% (23 @ 07:05) (94% - 97%)                   Daily     Daily Weight in k.7 (18 Mar 2023 06:00)      Bilirubin Total, Serum: 0.7 mg/dL ( @ 07:43)    CAPILLARY BLOOD GLUCOSE      POCT Blood Glucose.: 122 mg/dL (18 Mar 2023 07:16)  POCT Blood Glucose.: 117 mg/dL (17 Mar 2023 20:51)  POCT Blood Glucose.: 110 mg/dL (17 Mar 2023 16:29)  POCT Blood Glucose.: 122 mg/dL (17 Mar 2023 11:29)          Drains:     MS         [  ] Drainage:                 L Pleural  [  ]  Drainage:                R Pleural  [  ]  Drainage:    Pacing Wires        [  ]   Settings:                                  Isolated  [  ]    Coumadin    [ ] YES          [  ]      NO         Reason:                         PHYSICAL EXAM    Neurology: alert and oriented x 3, moves all extremities with no defecits  CV :  RRR  Sternal Wound :  CDI , Stable  Lungs:   CTA B/L  Abdomen: soft, nontender, nondistended, positive bowel sounds, last bowel movement   Extremities:                                            VITAL SIGNS    Telemetry:  afib   94    Vital Signs Last 24 Hrs  T(C): 36.7 (23 @ 03:00), Max: 36.7 (23 @ 11:07)  T(F): 98.1 (23 @ 03:00), Max: 98.1 (23 @ 22:54)  HR: 66 (23 @ 07:05) (63 - 85)  BP: 126/85 (23 @ 07:05) (121/77 - 127/62)  RR: 18 (23 @ 07:05) (18 - 18)  SpO2: 94% (23 @ 07:05) (94% - 97%)                   Daily     Daily Weight in k.7 (18 Mar 2023 06:00)      Bilirubin Total, Serum: 0.7 mg/dL ( @ 07:43)    CAPILLARY BLOOD GLUCOSE      POCT Blood Glucose.: 122 mg/dL (18 Mar 2023 07:16)  POCT Blood Glucose.: 117 mg/dL (17 Mar 2023 20:51)  POCT Blood Glucose.: 110 mg/dL (17 Mar 2023 16:29)  POCT Blood Glucose.: 122 mg/dL (17 Mar 2023 11:29)          Drains:               R Pleural  [  ]  Drainage:    Pacing Wires        [  ]   Settings:                                           PHYSICAL EXAM    Neurology: alert and oriented x 3, moves all extremities with no defecits  CV : I RRR  Sternal Wound :  CDI , Stable  Lungs:   CTA B/L  Abdomen: soft, nontender, nondistended, positive bowel sounds,  Extremities:     trace pedal edema

## 2023-03-19 LAB
ANION GAP SERPL CALC-SCNC: 10 MMOL/L — SIGNIFICANT CHANGE UP (ref 5–17)
APTT BLD: 56.2 SEC — HIGH (ref 27.5–35.5)
APTT BLD: 61.5 SEC — HIGH (ref 27.5–35.5)
APTT BLD: 65.9 SEC — HIGH (ref 27.5–35.5)
BLD GP AB SCN SERPL QL: NEGATIVE — SIGNIFICANT CHANGE UP
BUN SERPL-MCNC: 14 MG/DL — SIGNIFICANT CHANGE UP (ref 7–23)
CALCIUM SERPL-MCNC: 9.3 MG/DL — SIGNIFICANT CHANGE UP (ref 8.4–10.5)
CHLORIDE SERPL-SCNC: 103 MMOL/L — SIGNIFICANT CHANGE UP (ref 96–108)
CO2 SERPL-SCNC: 27 MMOL/L — SIGNIFICANT CHANGE UP (ref 22–31)
CREAT SERPL-MCNC: 0.89 MG/DL — SIGNIFICANT CHANGE UP (ref 0.5–1.3)
EGFR: 103 ML/MIN/1.73M2 — SIGNIFICANT CHANGE UP
GLUCOSE SERPL-MCNC: 123 MG/DL — HIGH (ref 70–99)
HCT VFR BLD CALC: 30.7 % — LOW (ref 39–50)
HGB BLD-MCNC: 10.3 G/DL — LOW (ref 13–17)
INR BLD: 1.16 RATIO — SIGNIFICANT CHANGE UP (ref 0.88–1.16)
MAGNESIUM SERPL-MCNC: 2.3 MG/DL — SIGNIFICANT CHANGE UP (ref 1.6–2.6)
MCHC RBC-ENTMCNC: 30.5 PG — SIGNIFICANT CHANGE UP (ref 27–34)
MCHC RBC-ENTMCNC: 33.6 GM/DL — SIGNIFICANT CHANGE UP (ref 32–36)
MCV RBC AUTO: 90.8 FL — SIGNIFICANT CHANGE UP (ref 80–100)
NRBC # BLD: 0 /100 WBCS — SIGNIFICANT CHANGE UP (ref 0–0)
PLATELET # BLD AUTO: 213 K/UL — SIGNIFICANT CHANGE UP (ref 150–400)
POTASSIUM SERPL-MCNC: 4.6 MMOL/L — SIGNIFICANT CHANGE UP (ref 3.5–5.3)
POTASSIUM SERPL-SCNC: 4.6 MMOL/L — SIGNIFICANT CHANGE UP (ref 3.5–5.3)
PROTHROM AB SERPL-ACNC: 13.4 SEC — SIGNIFICANT CHANGE UP (ref 10.5–13.4)
RBC # BLD: 3.38 M/UL — LOW (ref 4.2–5.8)
RBC # FLD: 12.5 % — SIGNIFICANT CHANGE UP (ref 10.3–14.5)
RH IG SCN BLD-IMP: POSITIVE — SIGNIFICANT CHANGE UP
SODIUM SERPL-SCNC: 140 MMOL/L — SIGNIFICANT CHANGE UP (ref 135–145)
WBC # BLD: 7.2 K/UL — SIGNIFICANT CHANGE UP (ref 3.8–10.5)
WBC # FLD AUTO: 7.2 K/UL — SIGNIFICANT CHANGE UP (ref 3.8–10.5)

## 2023-03-19 PROCEDURE — 71045 X-RAY EXAM CHEST 1 VIEW: CPT | Mod: 26

## 2023-03-19 RX ADMIN — OXYCODONE HYDROCHLORIDE 10 MILLIGRAM(S): 5 TABLET ORAL at 07:16

## 2023-03-19 RX ADMIN — AMLODIPINE BESYLATE 10 MILLIGRAM(S): 2.5 TABLET ORAL at 05:50

## 2023-03-19 RX ADMIN — OXYCODONE HYDROCHLORIDE 10 MILLIGRAM(S): 5 TABLET ORAL at 15:39

## 2023-03-19 RX ADMIN — POLYETHYLENE GLYCOL 3350 17 GRAM(S): 17 POWDER, FOR SOLUTION ORAL at 13:24

## 2023-03-19 RX ADMIN — AMIODARONE HYDROCHLORIDE 400 MILLIGRAM(S): 400 TABLET ORAL at 20:53

## 2023-03-19 RX ADMIN — AMIODARONE HYDROCHLORIDE 400 MILLIGRAM(S): 400 TABLET ORAL at 13:24

## 2023-03-19 RX ADMIN — GABAPENTIN 100 MILLIGRAM(S): 400 CAPSULE ORAL at 05:51

## 2023-03-19 RX ADMIN — OXYCODONE HYDROCHLORIDE 10 MILLIGRAM(S): 5 TABLET ORAL at 20:53

## 2023-03-19 RX ADMIN — Medication 100 MILLIGRAM(S): at 20:53

## 2023-03-19 RX ADMIN — PANTOPRAZOLE SODIUM 40 MILLIGRAM(S): 20 TABLET, DELAYED RELEASE ORAL at 05:50

## 2023-03-19 RX ADMIN — Medication 600 MILLIGRAM(S): at 16:27

## 2023-03-19 RX ADMIN — OXYCODONE HYDROCHLORIDE 10 MILLIGRAM(S): 5 TABLET ORAL at 15:09

## 2023-03-19 RX ADMIN — GABAPENTIN 100 MILLIGRAM(S): 400 CAPSULE ORAL at 13:24

## 2023-03-19 RX ADMIN — Medication 100 MILLIGRAM(S): at 13:24

## 2023-03-19 RX ADMIN — SENNA PLUS 2 TABLET(S): 8.6 TABLET ORAL at 20:53

## 2023-03-19 RX ADMIN — OXYCODONE HYDROCHLORIDE 10 MILLIGRAM(S): 5 TABLET ORAL at 21:23

## 2023-03-19 RX ADMIN — OXYCODONE HYDROCHLORIDE 10 MILLIGRAM(S): 5 TABLET ORAL at 07:46

## 2023-03-19 RX ADMIN — Medication 81 MILLIGRAM(S): at 13:23

## 2023-03-19 RX ADMIN — AMIODARONE HYDROCHLORIDE 400 MILLIGRAM(S): 400 TABLET ORAL at 05:50

## 2023-03-19 RX ADMIN — Medication 100 MILLIGRAM(S): at 05:51

## 2023-03-19 RX ADMIN — Medication 500 MILLIGRAM(S): at 05:51

## 2023-03-19 RX ADMIN — CHLORHEXIDINE GLUCONATE 1 APPLICATION(S): 213 SOLUTION TOPICAL at 21:56

## 2023-03-19 NOTE — PROGRESS NOTE ADULT - SUBJECTIVE AND OBJECTIVE BOX
VITAL SIGNS    Telemetry:      Vital Signs Last 24 Hrs  T(C): 36.8 (03-19-23 @ 03:00), Max: 37.1 (03-18-23 @ 11:07)  T(F): 98.2 (03-19-23 @ 03:00), Max: 98.7 (03-18-23 @ 11:07)  HR: 81 (03-19-23 @ 03:00) (66 - 86)  BP: 132/76 (03-19-23 @ 03:00) (122/64 - 133/88)  RR: 18 (03-19-23 @ 03:00) (16 - 18)  SpO2: 95% (03-19-23 @ 03:00) (91% - 95%)                   Daily     Daily       Bilirubin Total, Serum: 0.7 mg/dL (03-18 @ 07:43)    CAPILLARY BLOOD GLUCOSE      POCT Blood Glucose.: 167 mg/dL (18 Mar 2023 16:38)  POCT Blood Glucose.: 122 mg/dL (18 Mar 2023 07:16)          Drains:                R Pleural  [  ]  Drainage:    Pacing Wires        [  ]   Settings:                                               PHYSICAL EXAM    Neurology: alert and oriented x 3, moves all extremities with no defecits  CV :  RRR  Sternal Wound :  CDI , Stable  Lungs:   CTA B/L  Abdomen: soft, nontender, nondistended, positive bowel sounds, last bowel movement   Extremities:                                            VITAL SIGNS    Telemetry:    sr  80  Vital Signs Last 24 Hrs  T(C): 36.8 (03-19-23 @ 03:00), Max: 37.1 (03-18-23 @ 11:07)  T(F): 98.2 (03-19-23 @ 03:00), Max: 98.7 (03-18-23 @ 11:07)  HR: 81 (03-19-23 @ 03:00) (66 - 86)  BP: 132/76 (03-19-23 @ 03:00) (122/64 - 133/88)  RR: 18 (03-19-23 @ 03:00) (16 - 18)  SpO2: 95% (03-19-23 @ 03:00) (91% - 95%)                   Daily     Daily       Bilirubin Total, Serum: 0.7 mg/dL (03-18 @ 07:43)    CAPILLARY BLOOD GLUCOSE      POCT Blood Glucose.: 167 mg/dL (18 Mar 2023 16:38)  POCT Blood Glucose.: 122 mg/dL (18 Mar 2023 07:16)          Drains:                R Pleural  [  ]  Drainage:    Pacing Wires        [  ]   Settings:                                               PHYSICAL EXAM    Neurology: alert and oriented x 3, moves all extremities with no defecits  CV :  RRR  Sternal Wound :  CDI , Stable+ pw  Lungs:   CTA B/L  Abdomen: soft, nontender, nondistended, positive bowel sounds,  Extremities:     no edema

## 2023-03-19 NOTE — PROGRESS NOTE ADULT - PROBLEM SELECTOR PLAN 2
EP following  amio 400 q12  [id number] q8    10   am today       converted to  NSR  ?   DCCV  monday EP following  amio 400 q12  [id number] q8       converted to  NSR  ?   DCCV  monday

## 2023-03-19 NOTE — PROGRESS NOTE ADULT - ASSESSMENT
53 yo M with PMHx bicuspid AV, severe AI,     3/14 s/p mini sternotomy, AVR-t, aortic root enlargement 2FFP, 10cryo, 1plt  3/16 Transferred to SDU overnight. + R rad A-line, +MEDx2, +RPx1, monitor output. keep chest tubes till day 3 per Dr. Silva.   3/17   D/c ct's as drainage decreases.  Wean off pca, oral opioids    3/18   rt pl tube for drainage     lop 100 q8   amio 400 q8   EP following  in afib  90   perc for incisional pain         51 yo M with PMHx bicuspid AV, severe AI,     3/14 s/p mini sternotomy, AVR-t, aortic root enlargement 2FFP, 10cryo, 1plt  3/16 Transferred to SDU overnight. + R rad A-line, +MEDx2, +RPx1, monitor output. keep chest tubes till day 3 per Dr. Silva.   3/17   D/c ct's as drainage decreases.  Wean off pca, oral opioids    3/18   rt pl tube for drainage     lop 100 q8   amio 400 q8   EP following  in afib  90   perc for incisional pain  3/19      vss    NSR    amio 400 q8   and lop 100 q 8     ambulating     + pw    neg 1500

## 2023-03-19 NOTE — PROGRESS NOTE ADULT - PROBLEM SELECTOR PLAN 1
s/p AVR-t, aortic root enlargement   RPx1,   c/w ASA 81QD,  c/w amlodipine 10mg QD  c/w lopressor 100mg Q8   amio 400 q12    monitor output.of chest tube s/p AVR-t, aortic root enlargement   RPx1,   c/w ASA 81QD,  c/w amlodipine 10mg QD  c/w lopressor 100mg Q8   amio 400 q12

## 2023-03-20 LAB
ALBUMIN SERPL ELPH-MCNC: 3.9 G/DL — SIGNIFICANT CHANGE UP (ref 3.3–5)
ALP SERPL-CCNC: 50 U/L — SIGNIFICANT CHANGE UP (ref 40–120)
ALT FLD-CCNC: 29 U/L — SIGNIFICANT CHANGE UP (ref 10–45)
ANION GAP SERPL CALC-SCNC: 10 MMOL/L — SIGNIFICANT CHANGE UP (ref 5–17)
APTT BLD: 75.2 SEC — HIGH (ref 27.5–35.5)
APTT BLD: 90.9 SEC — HIGH (ref 27.5–35.5)
AST SERPL-CCNC: 24 U/L — SIGNIFICANT CHANGE UP (ref 10–40)
BILIRUB SERPL-MCNC: 0.7 MG/DL — SIGNIFICANT CHANGE UP (ref 0.2–1.2)
BUN SERPL-MCNC: 15 MG/DL — SIGNIFICANT CHANGE UP (ref 7–23)
CALCIUM SERPL-MCNC: 9.3 MG/DL — SIGNIFICANT CHANGE UP (ref 8.4–10.5)
CHLORIDE SERPL-SCNC: 101 MMOL/L — SIGNIFICANT CHANGE UP (ref 96–108)
CO2 SERPL-SCNC: 27 MMOL/L — SIGNIFICANT CHANGE UP (ref 22–31)
CREAT SERPL-MCNC: 0.95 MG/DL — SIGNIFICANT CHANGE UP (ref 0.5–1.3)
EGFR: 96 ML/MIN/1.73M2 — SIGNIFICANT CHANGE UP
GLUCOSE SERPL-MCNC: 120 MG/DL — HIGH (ref 70–99)
HCT VFR BLD CALC: 33 % — LOW (ref 39–50)
HGB BLD-MCNC: 11 G/DL — LOW (ref 13–17)
INR BLD: 1.15 RATIO — SIGNIFICANT CHANGE UP (ref 0.88–1.16)
MAGNESIUM SERPL-MCNC: 2.3 MG/DL — SIGNIFICANT CHANGE UP (ref 1.6–2.6)
MCHC RBC-ENTMCNC: 30.2 PG — SIGNIFICANT CHANGE UP (ref 27–34)
MCHC RBC-ENTMCNC: 33.3 GM/DL — SIGNIFICANT CHANGE UP (ref 32–36)
MCV RBC AUTO: 90.7 FL — SIGNIFICANT CHANGE UP (ref 80–100)
NRBC # BLD: 0 /100 WBCS — SIGNIFICANT CHANGE UP (ref 0–0)
PHOSPHATE SERPL-MCNC: 3.8 MG/DL — SIGNIFICANT CHANGE UP (ref 2.5–4.5)
PLATELET # BLD AUTO: 281 K/UL — SIGNIFICANT CHANGE UP (ref 150–400)
POTASSIUM SERPL-MCNC: 4.7 MMOL/L — SIGNIFICANT CHANGE UP (ref 3.5–5.3)
POTASSIUM SERPL-SCNC: 4.7 MMOL/L — SIGNIFICANT CHANGE UP (ref 3.5–5.3)
PROT SERPL-MCNC: 6.5 G/DL — SIGNIFICANT CHANGE UP (ref 6–8.3)
PROTHROM AB SERPL-ACNC: 13.2 SEC — SIGNIFICANT CHANGE UP (ref 10.5–13.4)
RBC # BLD: 3.64 M/UL — LOW (ref 4.2–5.8)
RBC # FLD: 12.5 % — SIGNIFICANT CHANGE UP (ref 10.3–14.5)
SODIUM SERPL-SCNC: 138 MMOL/L — SIGNIFICANT CHANGE UP (ref 135–145)
WBC # BLD: 8.15 K/UL — SIGNIFICANT CHANGE UP (ref 3.8–10.5)
WBC # FLD AUTO: 8.15 K/UL — SIGNIFICANT CHANGE UP (ref 3.8–10.5)

## 2023-03-20 PROCEDURE — 99232 SBSQ HOSP IP/OBS MODERATE 35: CPT

## 2023-03-20 PROCEDURE — 71045 X-RAY EXAM CHEST 1 VIEW: CPT | Mod: 26

## 2023-03-20 RX ORDER — AMIODARONE HYDROCHLORIDE 400 MG/1
200 TABLET ORAL DAILY
Refills: 0 | Status: DISCONTINUED | OUTPATIENT
Start: 2023-03-21 | End: 2023-03-22

## 2023-03-20 RX ORDER — METOPROLOL TARTRATE 50 MG
200 TABLET ORAL DAILY
Refills: 0 | Status: DISCONTINUED | OUTPATIENT
Start: 2023-03-21 | End: 2023-03-22

## 2023-03-20 RX ORDER — SORBITOL SOLUTION 70 %
30 SOLUTION, ORAL MISCELLANEOUS ONCE
Refills: 0 | Status: COMPLETED | OUTPATIENT
Start: 2023-03-20 | End: 2023-03-20

## 2023-03-20 RX ORDER — METOPROLOL TARTRATE 50 MG
100 TABLET ORAL ONCE
Refills: 0 | Status: COMPLETED | OUTPATIENT
Start: 2023-03-20 | End: 2023-03-20

## 2023-03-20 RX ORDER — BENZOCAINE AND MENTHOL 5; 1 G/100ML; G/100ML
1 LIQUID ORAL ONCE
Refills: 0 | Status: COMPLETED | OUTPATIENT
Start: 2023-03-20 | End: 2023-03-20

## 2023-03-20 RX ADMIN — OXYCODONE HYDROCHLORIDE 5 MILLIGRAM(S): 5 TABLET ORAL at 16:30

## 2023-03-20 RX ADMIN — OXYCODONE HYDROCHLORIDE 5 MILLIGRAM(S): 5 TABLET ORAL at 12:08

## 2023-03-20 RX ADMIN — Medication 600 MILLIGRAM(S): at 05:25

## 2023-03-20 RX ADMIN — AMIODARONE HYDROCHLORIDE 400 MILLIGRAM(S): 400 TABLET ORAL at 05:25

## 2023-03-20 RX ADMIN — OXYCODONE HYDROCHLORIDE 10 MILLIGRAM(S): 5 TABLET ORAL at 23:03

## 2023-03-20 RX ADMIN — OXYCODONE HYDROCHLORIDE 5 MILLIGRAM(S): 5 TABLET ORAL at 15:59

## 2023-03-20 RX ADMIN — Medication 81 MILLIGRAM(S): at 11:38

## 2023-03-20 RX ADMIN — OXYCODONE HYDROCHLORIDE 10 MILLIGRAM(S): 5 TABLET ORAL at 22:33

## 2023-03-20 RX ADMIN — Medication 100 MILLIGRAM(S): at 17:42

## 2023-03-20 RX ADMIN — Medication 600 MILLIGRAM(S): at 18:07

## 2023-03-20 RX ADMIN — PANTOPRAZOLE SODIUM 40 MILLIGRAM(S): 20 TABLET, DELAYED RELEASE ORAL at 05:25

## 2023-03-20 RX ADMIN — Medication 100 MILLIGRAM(S): at 05:25

## 2023-03-20 RX ADMIN — Medication 30 MILLILITER(S): at 10:34

## 2023-03-20 RX ADMIN — CHLORHEXIDINE GLUCONATE 1 APPLICATION(S): 213 SOLUTION TOPICAL at 21:39

## 2023-03-20 RX ADMIN — AMLODIPINE BESYLATE 10 MILLIGRAM(S): 2.5 TABLET ORAL at 05:26

## 2023-03-20 RX ADMIN — OXYCODONE HYDROCHLORIDE 5 MILLIGRAM(S): 5 TABLET ORAL at 11:38

## 2023-03-20 NOTE — PROGRESS NOTE ADULT - SUBJECTIVE AND OBJECTIVE BOX
VITAL SIGNS    Telemetry:    sr    Vital Signs Last 24 Hrs  T(C): 36.9 (23 @ 07:00), Max: 37 (23 @ 23:08)  T(F): 98.4 (23 @ 07:00), Max: 98.6 (23 @ 23:08)  HR: 88 (23 @ 09:10) (75 - 90)  BP: 112/68 (23 @ 07:00) (112/68 - 138/71)  RR: 18 (23 @ 09:10) (18 - 18)  SpO2: 93% (23 @ 09:10) (93% - 95%)                   Daily     Daily Weight in k.9 (20 Mar 2023 06:00)      Bilirubin Total, Serum: 0.7 mg/dL ( @ 07:24)    CAPILLARY BLOOD GLUCOSE    Drains:                R Pleural  [  ]  Drainage:    Pacing Wires                                 Isolated  [  ]                      PHYSICAL EXAM    Neurology: alert and oriented x 3, moves all extremities with no defecits  CV :  RRR  Sternal Wound :  CDI , Stable  + pw  Lungs:   CTA B/L  Abdomen: soft, nontender, nondistended, positive bowel sounds, l  Extremities:     no edema

## 2023-03-20 NOTE — PROGRESS NOTE ADULT - PROBLEM SELECTOR PLAN 1
s/p AVR-t, aortic root enlargement   RPx1,     probable remove later today  c/w ASA 81QD,  c/w amlodipine 10mg QD  c/w lopressor 100mg Q8   amio 400 q12

## 2023-03-20 NOTE — PROGRESS NOTE ADULT - SUBJECTIVE AND OBJECTIVE BOX
24H hour events: No acute events    MEDICATIONS:  aMIOdarone    Tablet   Oral   aMIOdarone    Tablet 400 milliGRAM(s) Oral every 8 hours  amLODIPine   Tablet 10 milliGRAM(s) Oral daily  aspirin enteric coated 81 milliGRAM(s) Oral daily  heparin  Infusion 1400 Unit(s)/Hr IV Continuous <Continuous>  metoprolol tartrate 100 milliGRAM(s) Oral every 8 hours  guaiFENesin  milliGRAM(s) Oral every 12 hours  acetaminophen     Tablet .. 650 milliGRAM(s) Oral every 6 hours PRN  ondansetron Injectable 4 milliGRAM(s) IV Push every 6 hours PRN  oxyCODONE    IR 5 milliGRAM(s) Oral every 4 hours PRN  oxyCODONE    IR 10 milliGRAM(s) Oral every 4 hours PRN  bisacodyl Suppository 10 milliGRAM(s) Rectal once  calcium carbonate    500 mG (Tums) Chewable 1 Tablet(s) Chew two times a day PRN  pantoprazole    Tablet 40 milliGRAM(s) Oral before breakfast  polyethylene glycol 3350 17 Gram(s) Oral daily  senna 2 Tablet(s) Oral at bedtime  dextrose 50% Injectable 50 milliLiter(s) IV Push every 15 minutes  dextrose 50% Injectable 25 milliLiter(s) IV Push every 15 minutes  dextrose Oral Gel 15 Gram(s) Oral once PRN  chlorhexidine 2% Cloths 1 Application(s) Topical daily  lidocaine   4% Patch 1 Patch Transdermal daily PRN  sodium chloride 0.9%. 1000 milliLiter(s) IV Continuous <Continuous>  sodium chloride 0.9%. 1000 milliLiter(s) IV Continuous <Continuous>      REVIEW OF SYSTEMS:  Complete 12 point ROS negative.    PHYSICAL EXAM:  T(C): 36.9 (23 @ 07:00), Max: 37 (23 @ 23:08)  HR: 75 (23 @ 07:00) (75 - 90)  BP: 112/68 (23 @ 07:00) (112/68 - 138/71)  RR: 18 (23 @ 07:00) (18 - 18)  SpO2: 95% (23 @ 07:00) (93% - 95%)  Wt(kg): --  I&O's Summary    19 Mar 2023 07:01  -  20 Mar 2023 07:00  --------------------------------------------------------  IN: 2963 mL / OUT: 4375 mL / NET: -1412 mL        Appearance: Normal	  HEENT:  PERRL, EOMI	  Cardiovascular: Normal S1 S2, No JVD, No murmurs, No edema  Respiratory: Lungs clear to auscultation	  Psychiatry: A & O x 3, Mood & affect appropriate  Gastrointestinal:  Soft, Non-tender, + BS	  Skin: No rashes, No ecchymoses, No cyanosis	  Neurologic: Non-focal  Extremities: No clubbing, cyanosis or edema  Vascular: Peripheral pulses palpable 2+ bilaterally        LABS:	 	    CBC Full  -  ( 20 Mar 2023 07:24 )  WBC Count : 8.15 K/uL  Hemoglobin : 11.0 g/dL  Hematocrit : 33.0 %  Platelet Count - Automated : 281 K/uL  Mean Cell Volume : 90.7 fl  Mean Cell Hemoglobin : 30.2 pg  Mean Cell Hemoglobin Concentration : 33.3 gm/dL  Auto Neutrophil # : x  Auto Lymphocyte # : x  Auto Monocyte # : x  Auto Eosinophil # : x  Auto Basophil # : x  Auto Neutrophil % : x  Auto Lymphocyte % : x  Auto Monocyte % : x  Auto Eosinophil % : x  Auto Basophil % : x    20    138  |  101  |  15  ----------------------------<  120<H>  4.7   |  27  |  0.95      140  |  103  |  14  ----------------------------<  123<H>  4.6   |  27  |  0.89    Ca    9.3      20 Mar 2023 07:24  Ca    9.3      19 Mar 2023 06:28  Phos  3.8     -20  Mg     2.3     -20  Mg     2.3         TPro  6.5  /  Alb  3.9  /  TBili  0.7  /  DBili  x   /  AST  24  /  ALT  29  /  AlkPhos  50        TELEMETRY: SR 70-90s, no AF since 3/18 10:05 am	      < from: Intra-Operative Transesophageal Echo (23 @ 12:48) >  PROCEDURE: Intra operative transeophageal echocardiogram  with 2D, M mode and complete  Doppler examination.  The  transesophageal probe was atraumatically placed in the  esophagus after induction of anesthesia.  INDICATION: Nonrheumatic aortic (valve) insufficiency  (I35.1)  ------------------------------------------------------------------------  Dimensions:    Normal Values:  LA:            2.0 - 4.0 cm  Ao:            2.0 - 3.8 cm  SEPTUM:        0.6 - 1.2 cm  PWT:           0.6 - 1.1 cm  LVIDd:         3.0 - 5.6 cm  LVIDs:         1.8 - 4.0 cm  EF (Visual Estimate): 50-55 %  ------------------------------------------------------------------------  Pre-Bypass Observations:  Mitral Valve: Normal mitral valve. Minimal mitral  regurgitation.  Aortic Valve/Aorta: Bicuspid AV, with fusion of the R and L  cusps. Moderate-severe aorticregurgitation. Eccentric jet  Mildly effaced STJ. Otherwise normal aortic root. Normal  descending and ascending aorta.  Left Atrium: Normal left atrium. No clot in RAFAT  Left Ventricle: Normal left ventricular systolic function.  No segmental wall motion abnormalities. Mildly dilated LV  Right Heart: Normal right atrium. Normal right ventricular  size and function. Normal tricuspid valve. Normal pulmonic  valve.  Pericardium/Pleura: Normal pericardium with no pericardial  effusion.  ------------------------------------------------------------------------  Post-Bypass Observations:    S/p 27mm AVR Hemisternotomy.  On low dose levo and , 115/65 NSR.  No PVL or Central AI at the aortic position. Gradient =  mean of 10-12mmhg  LVEF mildly reduced, septal motion consistent with pacing.  Rest of exam unchanged.  D/w Dr. Silva  ------------------------------------------------------------------------  Conclusions:  1. Normal mitral valve. Minimal mitral regurgitation.  2. Bicuspid AV, with fusion of the R and L cusps.  Moderate-severe aortic regurgitation. Eccentric jet  3. Mildly effaced STJ. Otherwise normal aortic root. Normal  descending and ascending aorta.  4. Normal left atrium. No clot in RAFAT  5. Mildly dilated LV  6. Normal left ventricular systolic function. No segmental  wall motion abnormalities.  7. Normal right atrium.  8. Normal tricuspid valve.  9. Normal pericardium with no pericardial effusion.  All findings dw surgeon.  On no drips.  Scheduled for hemisternotomy AVR.  VS as above.    < end of copied text >

## 2023-03-20 NOTE — PROGRESS NOTE ADULT - ASSESSMENT
53 y/o man with a bicuspid aortic valve now s/p AVR course complicated by atrial fibrillation currently rate controlled. EP consulted for possible cardioversion.    1. Atrial Fibrillation  2. Bicuspid AV s/p AVR    - SR 70-90s, converted 3/18   - Currently on IV Heparin  - On Amiodarone 400 mg Q8H, received  5.6 gm as of this AM  - Continue Metoprolol, can lower dose of BP/HR requires  - Close telemetry monitoring  - Maintain K>4.0 and Mg>2.0 51 y/o man with a bicuspid aortic valve now s/p AVR course complicated by atrial fibrillation currently rate controlled. EP consulted for possible cardioversion.    1. Atrial Fibrillation  2. Bicuspid AV s/p AVR    - SR 70-90s, converted 3/18   - Currently on IV Heparin - does not require long term AC as CHADSVASC = 0  - On Amiodarone 400 mg Q8H, received  5.6 gm as of this AM  - Continue Metoprolol, can lower dose of BP/HR requires  - Close telemetry monitoring  - Maintain K>4.0 and Mg>2.0 53 y/o man with a bicuspid aortic valve now s/p AVR course complicated by atrial fibrillation currently rate controlled. EP consulted for possible cardioversion.    1. Atrial Fibrillation  2. Bicuspid AV s/p AVR    - SR 70-90s, converted from AF to SR on 3/18   - Currently on IV Heparin - does not require long term AC as CHADSVASC = 0  - Would lower Amiodarone to 200 mg once daily, received 5.6 gm as of this AM   - Transition to Metoprolol Succinate 200 mg once daily  - Close telemetry monitoring  - Maintain K>4.0 and Mg>2.0  - EP to sign off, reconsult as needed 51 y/o man with a bicuspid aortic valve now s/p AVR course complicated by atrial fibrillation currently rate controlled. EP consulted for possible cardioversion.    1. Atrial Fibrillation  2. Bicuspid AV s/p AVR    - SR 70-90s, converted from AF to SR on 3/18   - Currently on IV Heparin - does not require long term AC as CHADSVASC = 0  - Would lower Amiodarone to 200 mg once daily, received 5.6 gm as of this AM   - Transition to Metoprolol Succinate 200 mg once daily  - Close telemetry monitoring  - Maintain K>4.0 and Mg>2.0  - Outpatient cardiology follow up (Dr. Jay Lisker)  - EP to sign off, reconsult as needed

## 2023-03-20 NOTE — PROGRESS NOTE ADULT - ASSESSMENT
51 yo M with PMHx bicuspid AV, severe AI,     3/14 s/p mini sternotomy, AVR-t, aortic root enlargement 2FFP, 10cryo, 1plt  3/16 Transferred to SDU overnight. + R rad A-line, +MEDx2, +RPx1, monitor output. keep chest tubes till day 3 per Dr. Silva.   3/17   D/c ct's as drainage decreases.  Wean off pca, oral opioids    3/18   rt pl tube for drainage     lop 100 q8   amio 400 q8   EP following  in afib  90   perc for incisional pain  3/19      vss    NSR    amio 400 q8   and lop 100 q 8     ambulating     + pw    neg 1500     3/20      vss rt pl tube 220 cc 24 hrs  hep gtt off now in sr   iso pw

## 2023-03-21 ENCOUNTER — TRANSCRIPTION ENCOUNTER (OUTPATIENT)
Age: 53
End: 2023-03-21

## 2023-03-21 LAB
ALBUMIN SERPL ELPH-MCNC: 4.3 G/DL — SIGNIFICANT CHANGE UP (ref 3.3–5)
ALP SERPL-CCNC: 57 U/L — SIGNIFICANT CHANGE UP (ref 40–120)
ALT FLD-CCNC: 49 U/L — HIGH (ref 10–45)
ANION GAP SERPL CALC-SCNC: 12 MMOL/L — SIGNIFICANT CHANGE UP (ref 5–17)
APTT BLD: 28.4 SEC — SIGNIFICANT CHANGE UP (ref 27.5–35.5)
AST SERPL-CCNC: 36 U/L — SIGNIFICANT CHANGE UP (ref 10–40)
BILIRUB SERPL-MCNC: 0.8 MG/DL — SIGNIFICANT CHANGE UP (ref 0.2–1.2)
BUN SERPL-MCNC: 14 MG/DL — SIGNIFICANT CHANGE UP (ref 7–23)
CALCIUM SERPL-MCNC: 9.6 MG/DL — SIGNIFICANT CHANGE UP (ref 8.4–10.5)
CHLORIDE SERPL-SCNC: 100 MMOL/L — SIGNIFICANT CHANGE UP (ref 96–108)
CO2 SERPL-SCNC: 25 MMOL/L — SIGNIFICANT CHANGE UP (ref 22–31)
CREAT SERPL-MCNC: 0.97 MG/DL — SIGNIFICANT CHANGE UP (ref 0.5–1.3)
EGFR: 94 ML/MIN/1.73M2 — SIGNIFICANT CHANGE UP
GLUCOSE SERPL-MCNC: 120 MG/DL — HIGH (ref 70–99)
HCT VFR BLD CALC: 32.3 % — LOW (ref 39–50)
HGB BLD-MCNC: 10.7 G/DL — LOW (ref 13–17)
MAGNESIUM SERPL-MCNC: 2.2 MG/DL — SIGNIFICANT CHANGE UP (ref 1.6–2.6)
MCHC RBC-ENTMCNC: 29.6 PG — SIGNIFICANT CHANGE UP (ref 27–34)
MCHC RBC-ENTMCNC: 33.1 GM/DL — SIGNIFICANT CHANGE UP (ref 32–36)
MCV RBC AUTO: 89.5 FL — SIGNIFICANT CHANGE UP (ref 80–100)
NRBC # BLD: 0 /100 WBCS — SIGNIFICANT CHANGE UP (ref 0–0)
PHOSPHATE SERPL-MCNC: 4 MG/DL — SIGNIFICANT CHANGE UP (ref 2.5–4.5)
PLATELET # BLD AUTO: 267 K/UL — SIGNIFICANT CHANGE UP (ref 150–400)
POTASSIUM SERPL-MCNC: 4.2 MMOL/L — SIGNIFICANT CHANGE UP (ref 3.5–5.3)
POTASSIUM SERPL-SCNC: 4.2 MMOL/L — SIGNIFICANT CHANGE UP (ref 3.5–5.3)
PROT SERPL-MCNC: 6.7 G/DL — SIGNIFICANT CHANGE UP (ref 6–8.3)
RBC # BLD: 3.61 M/UL — LOW (ref 4.2–5.8)
RBC # FLD: 12.4 % — SIGNIFICANT CHANGE UP (ref 10.3–14.5)
SODIUM SERPL-SCNC: 137 MMOL/L — SIGNIFICANT CHANGE UP (ref 135–145)
WBC # BLD: 7.94 K/UL — SIGNIFICANT CHANGE UP (ref 3.8–10.5)
WBC # FLD AUTO: 7.94 K/UL — SIGNIFICANT CHANGE UP (ref 3.8–10.5)

## 2023-03-21 PROCEDURE — 71045 X-RAY EXAM CHEST 1 VIEW: CPT | Mod: 26

## 2023-03-21 RX ORDER — BENZOCAINE AND MENTHOL 5; 1 G/100ML; G/100ML
1 LIQUID ORAL EVERY 4 HOURS
Refills: 0 | Status: DISCONTINUED | OUTPATIENT
Start: 2023-03-21 | End: 2023-03-22

## 2023-03-21 RX ADMIN — OXYCODONE HYDROCHLORIDE 5 MILLIGRAM(S): 5 TABLET ORAL at 21:38

## 2023-03-21 RX ADMIN — OXYCODONE HYDROCHLORIDE 5 MILLIGRAM(S): 5 TABLET ORAL at 22:38

## 2023-03-21 RX ADMIN — BENZOCAINE AND MENTHOL 1 LOZENGE: 5; 1 LIQUID ORAL at 14:23

## 2023-03-21 RX ADMIN — Medication 200 MILLIGRAM(S): at 06:14

## 2023-03-21 RX ADMIN — CHLORHEXIDINE GLUCONATE 1 APPLICATION(S): 213 SOLUTION TOPICAL at 22:29

## 2023-03-21 RX ADMIN — Medication 81 MILLIGRAM(S): at 11:08

## 2023-03-21 RX ADMIN — PANTOPRAZOLE SODIUM 40 MILLIGRAM(S): 20 TABLET, DELAYED RELEASE ORAL at 06:14

## 2023-03-21 RX ADMIN — OXYCODONE HYDROCHLORIDE 10 MILLIGRAM(S): 5 TABLET ORAL at 15:23

## 2023-03-21 RX ADMIN — BENZOCAINE AND MENTHOL 1 LOZENGE: 5; 1 LIQUID ORAL at 00:17

## 2023-03-21 RX ADMIN — OXYCODONE HYDROCHLORIDE 10 MILLIGRAM(S): 5 TABLET ORAL at 16:00

## 2023-03-21 RX ADMIN — Medication 100 MILLIGRAM(S): at 22:07

## 2023-03-21 RX ADMIN — AMIODARONE HYDROCHLORIDE 200 MILLIGRAM(S): 400 TABLET ORAL at 06:14

## 2023-03-21 RX ADMIN — Medication 600 MILLIGRAM(S): at 17:28

## 2023-03-21 RX ADMIN — Medication 600 MILLIGRAM(S): at 06:14

## 2023-03-21 RX ADMIN — AMLODIPINE BESYLATE 10 MILLIGRAM(S): 2.5 TABLET ORAL at 06:14

## 2023-03-21 NOTE — DISCHARGE NOTE NURSING/CASE MANAGEMENT/SOCIAL WORK - PATIENT PORTAL LINK FT
You can access the FollowMyHealth Patient Portal offered by [facility] by registering at the following website: [email or web address]. By joining [facility]’s FollowMyHealth portal, you will also be able to view your health information using other applications (apps) compatible with our system.

## 2023-03-21 NOTE — PROGRESS NOTE ADULT - ASSESSMENT
53 yo M with PMHx bicuspid AV, severe AI,     3/14 s/p mini sternotomy, AVR-t, aortic root enlargement 2FFP, 10cryo, 1plt  3/16 Transferred to SDU overnight. + R rad A-line, +MEDx2, +RPx1, monitor output. keep chest tubes till day 3 per Dr. Silva.   3/17   D/c ct's as drainage decreases.  Wean off pca, oral opioids    3/18   rt pl tube for drainage     lop 100 q8   amio 400 q8   EP following  in afib  90   perc for incisional pain  3/19      vss    NSR    amio 400 q8   and lop 100 q 8     ambulating     + pw    neg 1500     3/20      vss rt pl tube 220 cc 24 hrs  hep gtt off now in sr   iso pw     3/21 CT's out, d/c pw today  Nsr  d/c planning tomorrow

## 2023-03-21 NOTE — PROGRESS NOTE ADULT - SUBJECTIVE AND OBJECTIVE BOX
VITAL SIGNS    Telemetry:  nsr 100    Vital Signs Last 24 Hrs  T(C): 36.9 (23 @ 12:03), Max: 36.9 (23 @ 12:03)  T(F): 98.4 (23 @ 12:03), Max: 98.4 (23 @ 12:03)  HR: 92 (23 @ 12:03) (83 - 100)  BP: 108/73 (23 @ 12:03) (108/73 - 146/81)  RR: 18 (23 @ 12:03) (10 - 18)  SpO2: 95% (23 @ 12:03) (92% - 96%)                    @ 07:01  -   @ 07:00  --------------------------------------------------------  IN: 360 mL / OUT: 3610 mL / NET: -3250 mL     @ 07:01  -   @ 14:06  --------------------------------------------------------  IN: 640 mL / OUT: 1580 mL / NET: -940 mL          Daily     Daily Weight in k.9 (21 Mar 2023 06:10)            CAPILLARY BLOOD GLUCOSE                Drains:         Pacing Wires        [  ]   Settings:                                  Isolated  [x  ]    Coumadin    [ ] YES          [x  ]      NO                                   PHYSICAL EXAM        Neurology: alert and oriented x 3, nonfocal, no gross deficits  CV : s1 s2 RRR  Sternal Wound :  CDI , Stable  Lungs: cta  Abdomen: soft, nontender, nondistended, positive bowel sounds, last bowel movement +                      :    voiding   Extremities:   trace   edema   /  -   calve tenderness ,             acetaminophen     Tablet .. 650 milliGRAM(s) Oral every 6 hours PRN  aMIOdarone    Tablet 200 milliGRAM(s) Oral daily  amLODIPine   Tablet 10 milliGRAM(s) Oral daily  aspirin enteric coated 81 milliGRAM(s) Oral daily  benzocaine/menthol Lozenge 1 Lozenge Oral every 4 hours PRN  bisacodyl Suppository 10 milliGRAM(s) Rectal once  calcium carbonate    500 mG (Tums) Chewable 1 Tablet(s) Chew two times a day PRN  chlorhexidine 2% Cloths 1 Application(s) Topical daily  dextrose 50% Injectable 50 milliLiter(s) IV Push every 15 minutes  dextrose 50% Injectable 25 milliLiter(s) IV Push every 15 minutes  dextrose Oral Gel 15 Gram(s) Oral once PRN  guaiFENesin  milliGRAM(s) Oral every 12 hours  lidocaine   4% Patch 1 Patch Transdermal daily PRN  metoprolol succinate  milliGRAM(s) Oral daily  naloxone Injectable 0.1 milliGRAM(s) IV Push every 3 minutes PRN  ondansetron Injectable 4 milliGRAM(s) IV Push every 6 hours PRN  oxyCODONE    IR 5 milliGRAM(s) Oral every 4 hours PRN  oxyCODONE    IR 10 milliGRAM(s) Oral every 4 hours PRN  pantoprazole    Tablet 40 milliGRAM(s) Oral before breakfast  polyethylene glycol 3350 17 Gram(s) Oral daily  senna 2 Tablet(s) Oral at bedtime  sodium chloride 0.9%. 1000 milliLiter(s) IV Continuous <Continuous>  sodium chloride 0.9%. 1000 milliLiter(s) IV Continuous <Continuous>                    Physical Therapy Rec:   Home  [  ]   Home w/ PT  [  ]  Rehab  [  ]  Discussed with Cardiothoracic Team at AM rounds.

## 2023-03-22 ENCOUNTER — TRANSCRIPTION ENCOUNTER (OUTPATIENT)
Age: 53
End: 2023-03-22

## 2023-03-22 VITALS
TEMPERATURE: 99 F | DIASTOLIC BLOOD PRESSURE: 73 MMHG | SYSTOLIC BLOOD PRESSURE: 111 MMHG | HEART RATE: 85 BPM | RESPIRATION RATE: 18 BRPM | OXYGEN SATURATION: 95 %

## 2023-03-22 LAB
ANION GAP SERPL CALC-SCNC: 12 MMOL/L — SIGNIFICANT CHANGE UP (ref 5–17)
BUN SERPL-MCNC: 14 MG/DL — SIGNIFICANT CHANGE UP (ref 7–23)
CALCIUM SERPL-MCNC: 9.5 MG/DL — SIGNIFICANT CHANGE UP (ref 8.4–10.5)
CHLORIDE SERPL-SCNC: 103 MMOL/L — SIGNIFICANT CHANGE UP (ref 96–108)
CO2 SERPL-SCNC: 23 MMOL/L — SIGNIFICANT CHANGE UP (ref 22–31)
CREAT SERPL-MCNC: 0.89 MG/DL — SIGNIFICANT CHANGE UP (ref 0.5–1.3)
EGFR: 103 ML/MIN/1.73M2 — SIGNIFICANT CHANGE UP
GLUCOSE SERPL-MCNC: 121 MG/DL — HIGH (ref 70–99)
HCT VFR BLD CALC: 32.3 % — LOW (ref 39–50)
HGB BLD-MCNC: 10.7 G/DL — LOW (ref 13–17)
MAGNESIUM SERPL-MCNC: 2.3 MG/DL — SIGNIFICANT CHANGE UP (ref 1.6–2.6)
MCHC RBC-ENTMCNC: 29.7 PG — SIGNIFICANT CHANGE UP (ref 27–34)
MCHC RBC-ENTMCNC: 33.1 GM/DL — SIGNIFICANT CHANGE UP (ref 32–36)
MCV RBC AUTO: 89.7 FL — SIGNIFICANT CHANGE UP (ref 80–100)
NRBC # BLD: 0 /100 WBCS — SIGNIFICANT CHANGE UP (ref 0–0)
PHOSPHATE SERPL-MCNC: 4 MG/DL — SIGNIFICANT CHANGE UP (ref 2.5–4.5)
PLATELET # BLD AUTO: 303 K/UL — SIGNIFICANT CHANGE UP (ref 150–400)
POTASSIUM SERPL-MCNC: 4.2 MMOL/L — SIGNIFICANT CHANGE UP (ref 3.5–5.3)
POTASSIUM SERPL-SCNC: 4.2 MMOL/L — SIGNIFICANT CHANGE UP (ref 3.5–5.3)
RBC # BLD: 3.6 M/UL — LOW (ref 4.2–5.8)
RBC # FLD: 12.4 % — SIGNIFICANT CHANGE UP (ref 10.3–14.5)
SODIUM SERPL-SCNC: 138 MMOL/L — SIGNIFICANT CHANGE UP (ref 135–145)
SURGICAL PATHOLOGY STUDY: SIGNIFICANT CHANGE UP
WBC # BLD: 8.19 K/UL — SIGNIFICANT CHANGE UP (ref 3.8–10.5)
WBC # FLD AUTO: 8.19 K/UL — SIGNIFICANT CHANGE UP (ref 3.8–10.5)

## 2023-03-22 PROCEDURE — 86850 RBC ANTIBODY SCREEN: CPT

## 2023-03-22 PROCEDURE — C1751: CPT

## 2023-03-22 PROCEDURE — 85730 THROMBOPLASTIN TIME PARTIAL: CPT

## 2023-03-22 PROCEDURE — 82947 ASSAY GLUCOSE BLOOD QUANT: CPT

## 2023-03-22 PROCEDURE — C1889: CPT

## 2023-03-22 PROCEDURE — 85610 PROTHROMBIN TIME: CPT

## 2023-03-22 PROCEDURE — 86923 COMPATIBILITY TEST ELECTRIC: CPT

## 2023-03-22 PROCEDURE — P9073: CPT

## 2023-03-22 PROCEDURE — 85014 HEMATOCRIT: CPT

## 2023-03-22 PROCEDURE — P9059: CPT

## 2023-03-22 PROCEDURE — 82330 ASSAY OF CALCIUM: CPT

## 2023-03-22 PROCEDURE — 97166 OT EVAL MOD COMPLEX 45 MIN: CPT

## 2023-03-22 PROCEDURE — 84132 ASSAY OF SERUM POTASSIUM: CPT

## 2023-03-22 PROCEDURE — 97110 THERAPEUTIC EXERCISES: CPT

## 2023-03-22 PROCEDURE — 85384 FIBRINOGEN ACTIVITY: CPT

## 2023-03-22 PROCEDURE — 97116 GAIT TRAINING THERAPY: CPT

## 2023-03-22 PROCEDURE — U0005: CPT

## 2023-03-22 PROCEDURE — P9012: CPT

## 2023-03-22 PROCEDURE — 82553 CREATINE MB FRACTION: CPT

## 2023-03-22 PROCEDURE — 85520 HEPARIN ASSAY: CPT

## 2023-03-22 PROCEDURE — 82962 GLUCOSE BLOOD TEST: CPT

## 2023-03-22 PROCEDURE — 94003 VENT MGMT INPAT SUBQ DAY: CPT

## 2023-03-22 PROCEDURE — 88305 TISSUE EXAM BY PATHOLOGIST: CPT

## 2023-03-22 PROCEDURE — 85027 COMPLETE CBC AUTOMATED: CPT

## 2023-03-22 PROCEDURE — P9045: CPT

## 2023-03-22 PROCEDURE — 86965 POOLING BLOOD PLATELETS: CPT

## 2023-03-22 PROCEDURE — 84484 ASSAY OF TROPONIN QUANT: CPT

## 2023-03-22 PROCEDURE — 80048 BASIC METABOLIC PNL TOTAL CA: CPT

## 2023-03-22 PROCEDURE — 85018 HEMOGLOBIN: CPT

## 2023-03-22 PROCEDURE — 82565 ASSAY OF CREATININE: CPT

## 2023-03-22 PROCEDURE — 84100 ASSAY OF PHOSPHORUS: CPT

## 2023-03-22 PROCEDURE — 83605 ASSAY OF LACTIC ACID: CPT

## 2023-03-22 PROCEDURE — C1887: CPT

## 2023-03-22 PROCEDURE — 82803 BLOOD GASES ANY COMBINATION: CPT

## 2023-03-22 PROCEDURE — U0003: CPT

## 2023-03-22 PROCEDURE — 80053 COMPREHEN METABOLIC PANEL: CPT

## 2023-03-22 PROCEDURE — 86901 BLOOD TYPING SEROLOGIC RH(D): CPT

## 2023-03-22 PROCEDURE — 97162 PT EVAL MOD COMPLEX 30 MIN: CPT

## 2023-03-22 PROCEDURE — 85025 COMPLETE CBC W/AUTO DIFF WBC: CPT

## 2023-03-22 PROCEDURE — 83735 ASSAY OF MAGNESIUM: CPT

## 2023-03-22 PROCEDURE — C1769: CPT

## 2023-03-22 PROCEDURE — C1768: CPT

## 2023-03-22 PROCEDURE — 86891 AUTOLOGOUS BLOOD OP SALVAGE: CPT

## 2023-03-22 PROCEDURE — 84295 ASSAY OF SERUM SODIUM: CPT

## 2023-03-22 PROCEDURE — 71045 X-RAY EXAM CHEST 1 VIEW: CPT

## 2023-03-22 PROCEDURE — 86900 BLOOD TYPING SEROLOGIC ABO: CPT

## 2023-03-22 PROCEDURE — 82435 ASSAY OF BLOOD CHLORIDE: CPT

## 2023-03-22 PROCEDURE — 93005 ELECTROCARDIOGRAM TRACING: CPT

## 2023-03-22 PROCEDURE — 36415 COLL VENOUS BLD VENIPUNCTURE: CPT

## 2023-03-22 PROCEDURE — 82550 ASSAY OF CK (CPK): CPT

## 2023-03-22 RX ORDER — SENNA PLUS 8.6 MG/1
2 TABLET ORAL
Qty: 14 | Refills: 0
Start: 2023-03-22 | End: 2023-03-28

## 2023-03-22 RX ORDER — METOPROLOL TARTRATE 50 MG
1 TABLET ORAL
Qty: 30 | Refills: 0
Start: 2023-03-22 | End: 2023-04-20

## 2023-03-22 RX ORDER — AMIODARONE HYDROCHLORIDE 400 MG/1
1 TABLET ORAL
Qty: 30 | Refills: 0
Start: 2023-03-22 | End: 2023-04-20

## 2023-03-22 RX ORDER — PANTOPRAZOLE SODIUM 20 MG/1
1 TABLET, DELAYED RELEASE ORAL
Qty: 30 | Refills: 0
Start: 2023-03-22 | End: 2023-04-20

## 2023-03-22 RX ORDER — ATENOLOL 25 MG/1
1 TABLET ORAL
Qty: 0 | Refills: 0 | DISCHARGE

## 2023-03-22 RX ORDER — ACETAMINOPHEN 500 MG
2 TABLET ORAL
Qty: 0 | Refills: 0 | DISCHARGE
Start: 2023-03-22

## 2023-03-22 RX ORDER — ASPIRIN/CALCIUM CARB/MAGNESIUM 324 MG
1 TABLET ORAL
Qty: 30 | Refills: 0
Start: 2023-03-22 | End: 2023-04-20

## 2023-03-22 RX ORDER — AMLODIPINE BESYLATE 2.5 MG/1
1 TABLET ORAL
Qty: 30 | Refills: 0
Start: 2023-03-22 | End: 2023-04-20

## 2023-03-22 RX ADMIN — AMLODIPINE BESYLATE 10 MILLIGRAM(S): 2.5 TABLET ORAL at 05:30

## 2023-03-22 RX ADMIN — BENZOCAINE AND MENTHOL 1 LOZENGE: 5; 1 LIQUID ORAL at 10:40

## 2023-03-22 RX ADMIN — Medication 81 MILLIGRAM(S): at 11:17

## 2023-03-22 RX ADMIN — Medication 200 MILLIGRAM(S): at 05:30

## 2023-03-22 RX ADMIN — PANTOPRAZOLE SODIUM 40 MILLIGRAM(S): 20 TABLET, DELAYED RELEASE ORAL at 05:30

## 2023-03-22 RX ADMIN — AMIODARONE HYDROCHLORIDE 200 MILLIGRAM(S): 400 TABLET ORAL at 05:30

## 2023-03-22 RX ADMIN — Medication 600 MILLIGRAM(S): at 05:30

## 2023-03-22 NOTE — DISCHARGE NOTE PROVIDER - NSDCCPCAREPLAN_GEN_ALL_CORE_FT
PRINCIPAL DISCHARGE DIAGNOSIS  Diagnosis: S/P AVR (aortic valve replacement)  Assessment and Plan of Treatment: Refer to your Cardiac Surgery Do's & Dont's Fact Sheet  ambulate 4-5 times a day  shower daily - wash incision with mild soap and water  take medications as prescribed  follow up appt with   regular diet  follow up appt with your Cardiologist and/or Primary Care MD in3-4 weeks

## 2023-03-22 NOTE — DISCHARGE NOTE PROVIDER - CARE PROVIDER_API CALL
True Silva)  Surgery; Thoracic and Cardiac Surgery  [address] [place] [address]  Phone: [phone]  Fax: [phone]  Established Patient  Scheduled Appointment: 04/12/2023 09:30 AM    Randell Cornejo (NP; [place] [address]  Phone: [phone]  Fax: [phone]  Established Patient  Scheduled Appointment: 03/29/2023 10:00 AM

## 2023-03-22 NOTE — DISCHARGE NOTE PROVIDER - HOSPITAL COURSE
53 yo M with PMHx bicuspid AV, severe AI,     3/14 s/p mini sternotomy, AVR-t, aortic root enlargement 2FFP, 10cryo, 1plt  3/16 Transferred to SDU overnight. + R rad A-line, +MEDx2, +RPx1, monitor output. keep chest tubes till day 3 per Dr. Silva.   3/17   D/c ct's as drainage decreases.  Wean off pca, oral opioids    3/18   rt pl tube for drainage     lop 100 q8   amio 400 q8   EP following  in afib  90   perc for incisional pain  3/19      vss    NSR    amio 400 q8   and lop 100 q 8     ambulating     + pw    neg 1500     3/20      vss rt pl tube 220 cc 24 hrs  hep gtt off now in sr   iso pw     3/21 CT's out, d/c pw today sr  3/22 VSS labs WNL - right groin stitichd/c planning tomorrow

## 2023-03-22 NOTE — DISCHARGE NOTE PROVIDER - NSDCMRMEDTOKEN_GEN_ALL_CORE_FT
acetaminophen: 2 tab(s) orally every 6 hours, As Needed  for mild pain  amiodarone 200 mg oral tablet: 1 tab(s) orally once a day  amLODIPine 10 mg oral tablet: 1 tab(s) orally once a day  aspirin 81 mg oral delayed release tablet: 1 tab(s) orally once a day  metoprolol succinate 200 mg oral tablet, extended release: 1 tab(s) orally once a day  pantoprazole 40 mg oral delayed release tablet: 1 tab(s) orally once a day (before a meal)  senna leaf extract oral tablet: 2 tab(s) orally once a day (at bedtime)

## 2023-03-22 NOTE — DISCHARGE NOTE PROVIDER - PROVIDER TOKENS
PROVIDER:[TOKEN:[[id number]],SCHEDULEDAPPT:[04/12/2023],SCHEDULEDAPPTTIME:[09:30 AM],ESTABLISHEDPATIENT:[T]],PROVIDER:[TOKEN:[[id number]],SCHEDULEDAPPT:[03/29/2023],SCHEDULEDAPPTTIME:[10:00 AM],ESTABLISHEDPATIENT:[T]]

## 2023-03-23 ENCOUNTER — APPOINTMENT (OUTPATIENT)
Dept: CARE COORDINATION | Facility: HOME HEALTH | Age: 53
End: 2023-03-23
Payer: COMMERCIAL

## 2023-03-23 VITALS
DIASTOLIC BLOOD PRESSURE: 76 MMHG | HEART RATE: 90 BPM | RESPIRATION RATE: 12 BRPM | SYSTOLIC BLOOD PRESSURE: 132 MMHG | OXYGEN SATURATION: 96 %

## 2023-03-23 PROCEDURE — 99024 POSTOP FOLLOW-UP VISIT: CPT

## 2023-03-23 NOTE — PHYSICAL EXAM
[Sclera] : the sclera and conjunctiva were normal [Neck Appearance] : the appearance of the neck was normal [] : no respiratory distress [Respiration, Rhythm And Depth] : normal respiratory rhythm and effort [Exaggerated Use Of Accessory Muscles For Inspiration] : no accessory muscle use [Auscultation Breath Sounds / Voice Sounds] : lungs were clear to auscultation bilaterally [Apical Impulse] : the apical impulse was normal [Heart Rate And Rhythm] : heart rate was normal and rhythm regular [Heart Sounds] : normal S1 and S2 [Heart Sounds Gallop] : no gallops [Murmurs] : no murmurs [Heart Sounds Pericardial Friction Rub] : no pericardial rub [FreeTextEntry1] : MSI & CT sites without erythema, drainage or warmth, with edges well approximated. BL LE -no edema. [Examination Of The Chest] : the chest was normal in appearance [Chest Visual Inspection Thoracic Asymmetry] : no chest asymmetry [Diminished Respiratory Excursion] : normal chest expansion [Breast Appearance] : normal in appearance [Bowel Sounds] : normal bowel sounds [Abdomen Soft] : soft [Abdomen Tenderness] : non-tender [Abnormal Walk] : normal gait [Skin Color & Pigmentation] : normal skin color and pigmentation [Motor Exam] : the motor exam was normal [No Focal Deficits] : no focal deficits [Oriented To Time, Place, And Person] : oriented to person, place, and time [Impaired Insight] : insight and judgment were intact [Affect] : the affect was normal [Mood] : the mood was normal

## 2023-03-23 NOTE — REASON FOR VISIT
[Post Hospitalization] : a post hospitalization visit [FreeTextEntry1] : FOLLOW YOUR HEART - Transitional Care Management Program - [facility]

## 2023-03-23 NOTE — HISTORY OF PRESENT ILLNESS
[FreeTextEntry1] : 53 yo M with PMHx bicuspid AV and severe AI. Pt. reports experiencing mild dyspnea on exertion and denies exertional chest discomfort. Pt. reports lung nodule was incidentally noted on diagnostic imaging, s/p PET scan 3/8/23 as per request of Dr. Silva.\par  \par 3/14 s/p mini sternotomy, AVR-t, aortic root enlargement 2FFP, 10cryo, 1plt\par 3/16 Transferred to SDU overnight. + R rad A-line, +MEDx2, +RPx1, monitor\par output. keep chest tubes till day 3 per Dr. Silva.\par 3/17   D/c ct's as drainage decreases.\par Wean off pca, oral opioids\par 3/18   rt pl tube for drainage     lop 100 q8   amio 400 q8   EP following  in\par afib  90   perc for incisional pain\par 3/19      vss ;NSR ; amio 400 q8   and lop 100 q 8  ;ambulating ; + pw ; neg 1500\par 3/20      vss rt pl tube 220 cc 24 hrs  hep gtt off now in sr   iso pw\par 3/21 CT's out, d/c pw today sr\par 3/22 VSS labs WNL - right groin stitch removed. DC home today\par 3/23 Seen by [facility] NP for a f/u home visit. Emotional support and education provided. All questions answered. Pt overall is recovering well besides c/o dry cough.

## 2023-03-23 NOTE — ASSESSMENT
[FreeTextEntry1] : Pt recovering well at home s/p cardiac surgery. Pt lives with wife. He has 2 grown children. He is a , currently still working but is not under stress as his co-workers have been able to assist w/ work. Reviewed all medications and dosages with pt understanding. Pt has all medications in home and is taking as prescribed. Pt does not c/o pain. Pt endorses a dry cough. He is aware to notify me if by Monday this cough still is bothering him or if the cough worsens. If cough or symptoms worsen a COVID test will be recommended to him. Pt is aware of F/U appts scheduled and that need to be scheduled as listed below.\par

## 2023-03-27 NOTE — CHART NOTE - NSCHARTNOTEFT_GEN_A_CORE
This patient underwent an AVR(T) with aortic root enlargement with Dr. Silva on 3/14/23 and developed postoperative respiratory failure with hypoxia requiring high flow nasal cannula. This patient also had postoperative cardiogenic shock requiring IV dobutamine infusion.

## 2023-03-29 ENCOUNTER — APPOINTMENT (OUTPATIENT)
Dept: CARDIOTHORACIC SURGERY | Facility: CLINIC | Age: 53
End: 2023-03-29
Payer: COMMERCIAL

## 2023-03-29 ENCOUNTER — NON-APPOINTMENT (OUTPATIENT)
Age: 53
End: 2023-03-29

## 2023-03-29 VITALS
OXYGEN SATURATION: 97 % | DIASTOLIC BLOOD PRESSURE: 74 MMHG | BODY MASS INDEX: 27.54 KG/M2 | HEIGHT: 78 IN | WEIGHT: 238 LBS | TEMPERATURE: 98.5 F | RESPIRATION RATE: 16 BRPM | SYSTOLIC BLOOD PRESSURE: 127 MMHG | HEART RATE: 80 BPM

## 2023-03-29 PROCEDURE — 99024 POSTOP FOLLOW-UP VISIT: CPT

## 2023-03-29 NOTE — CONSULT LETTER
[FreeTextEntry2] : Dr.Jay Lisker, [FreeTextEntry3] : True Silva M.D.\par Professor of Cardiovascular and Thoracic Surgery\par Minimally Invasive Valve Surgeon\par Director of Aortic Surgery, [facility]\par Cell: [phone]\par Email: [email or web address]\par

## 2023-03-29 NOTE — REASON FOR VISIT
[de-identified] : Aortic valve replacement utilizing a size 27 mm [employer] bilateral valve, Aortic root enlargement down to noncoronary sinus  [de-identified] : 3/14/23 [Family Member] : family member

## 2023-03-29 NOTE — ASSESSMENT
[FreeTextEntry1] : Mr. CHO is a 52 year old male with  past medical history of bicuspid AV, severe AI. \par \par He is S/P  Aortic valve replacement utilizing a size 27 mm [employer] bilateral valve, Aortic root enlargement down to noncoronary sinus on 3/14/23. Post op course significant with AFib, amio 400 q8 . He is here for post op visit.  \par \par Today he presents and reports that he is doing well. Denies any chest pain, shortness of breath, chest pain, palpitations, dizziness or pedal edema. \par \par Today on exam patient's lungs clear bilaterally, normal sinus rhythm, sternum stable, incision clean, dry and intact.   No peripheral edema noted. \par \par  Instructed patient on importance of optimal glycemic control, daily showering, daily weights, any signs of fever (temperature greater than 101F, chills,  incentive spirometer use, and increase ambulation as tolerated. Instructed to call office with any signs or symptoms of infection or weight gain of 2 or more pounds in 1 day or 3 or more pounds in 1 week.  \par \par \par \par Plan:\par 1) Continue current medication regimen\par 2) Follow up with cardiologist ( Dr.Jay Lisker ) and PCP \par 3) Ambulate as tolerated  \par 4) Follow up  with  on 4/12/23 \par 5) SBE antibiotic prophylaxis discussed at length \par 6) Continue to increase activity and walk daily as tolerated. Continue to use incentive spirometer. \par 7) Keep legs elevated above heart when resting/sitting/sleeping. \par 8) Call MD if you experience fever, fatigue, dizziness, confusion, syncope, shortness of breath, chest pain not relieved with analgesics, increased redness/drainage from the surgical  incision site\par 9) Virtual Cardiac Rehab referral \par

## 2023-04-04 ENCOUNTER — NON-APPOINTMENT (OUTPATIENT)
Age: 53
End: 2023-04-04

## 2023-04-04 ENCOUNTER — APPOINTMENT (OUTPATIENT)
Dept: CARDIOLOGY | Facility: CLINIC | Age: 53
End: 2023-04-04
Payer: COMMERCIAL

## 2023-04-04 ENCOUNTER — LABORATORY RESULT (OUTPATIENT)
Age: 53
End: 2023-04-04

## 2023-04-04 VITALS
SYSTOLIC BLOOD PRESSURE: 118 MMHG | OXYGEN SATURATION: 97 % | DIASTOLIC BLOOD PRESSURE: 90 MMHG | BODY MASS INDEX: 27.88 KG/M2 | HEIGHT: 78 IN | WEIGHT: 241 LBS | HEART RATE: 81 BPM

## 2023-04-04 PROCEDURE — 99214 OFFICE O/P EST MOD 30 MIN: CPT | Mod: 25

## 2023-04-04 PROCEDURE — 93000 ELECTROCARDIOGRAM COMPLETE: CPT

## 2023-04-04 RX ORDER — ACETAMINOPHEN 325 MG/1
325 TABLET ORAL EVERY 6 HOURS
Qty: 56 | Refills: 0 | Status: DISCONTINUED | COMMUNITY
Start: 2023-03-23 | End: 2023-04-04

## 2023-04-04 RX ORDER — SENNA 8.6 MG/1
8.6 TABLET, FILM COATED ORAL
Qty: 40 | Refills: 0 | Status: DISCONTINUED | COMMUNITY
Start: 2023-03-23 | End: 2023-04-04

## 2023-04-05 LAB
ALBUMIN SERPL ELPH-MCNC: 4.7 G/DL
ALP BLD-CCNC: 100 U/L
ALT SERPL-CCNC: 28 U/L
ANION GAP SERPL CALC-SCNC: 14 MMOL/L
AST SERPL-CCNC: 28 U/L
BASOPHILS # BLD AUTO: 0.06 K/UL
BASOPHILS NFR BLD AUTO: 0.8 %
BILIRUB SERPL-MCNC: 0.4 MG/DL
BUN SERPL-MCNC: 18 MG/DL
CALCIUM SERPL-MCNC: 10.1 MG/DL
CHLORIDE SERPL-SCNC: 101 MMOL/L
CO2 SERPL-SCNC: 23 MMOL/L
CREAT SERPL-MCNC: 0.98 MG/DL
EGFR: 93 ML/MIN/1.73M2
EOSINOPHIL # BLD AUTO: 0.27 K/UL
EOSINOPHIL NFR BLD AUTO: 3.7 %
GLUCOSE SERPL-MCNC: 114 MG/DL
HCT VFR BLD CALC: 37.6 %
HGB BLD-MCNC: 12.2 G/DL
IMM GRANULOCYTES NFR BLD AUTO: 1.1 %
LYMPHOCYTES # BLD AUTO: 1.51 K/UL
LYMPHOCYTES NFR BLD AUTO: 20.9 %
MAN DIFF?: NORMAL
MCHC RBC-ENTMCNC: 29.9 PG
MCHC RBC-ENTMCNC: 32.4 GM/DL
MCV RBC AUTO: 92.2 FL
MONOCYTES # BLD AUTO: 0.68 K/UL
MONOCYTES NFR BLD AUTO: 9.4 %
NEUTROPHILS # BLD AUTO: 4.62 K/UL
NEUTROPHILS NFR BLD AUTO: 64.1 %
PLATELET # BLD AUTO: 427 K/UL
POTASSIUM SERPL-SCNC: 4.2 MMOL/L
PROT SERPL-MCNC: 7.2 G/DL
RBC # BLD: 4.08 M/UL
RBC # FLD: 13.1 %
SODIUM SERPL-SCNC: 138 MMOL/L
TSH SERPL-ACNC: 4.43 UIU/ML
WBC # FLD AUTO: 7.22 K/UL

## 2023-04-05 NOTE — DISCUSSION/SUMMARY
[FreeTextEntry1] :  Mr. Witt is a 52-year-old with bicuspid aortic valve and moderate to severe aortic insufficiency on echo with good exercise capacity. \par Now s/p AVR. postoperatively complicated by prolonged chest tube drainage and PAF.\par Now in sinus rhythm. ECG now has Lateral ST segment depressions.\par \par Feeling well postoperatively. Exercising already by walking. Discussed rehab, but likely doing more than would be done there.\par Echo to monitor new aortic valve \par PAF: no a/c. Continue amiodarone for 3 months total. then d/c.\par Stressed requirement for abx prophy. Amox 1g 1 hour prior to procedure.\par  [EKG obtained to assist in diagnosis and management of assessed problem(s)] : EKG obtained to assist in diagnosis and management of assessed problem(s)

## 2023-04-05 NOTE — HISTORY OF PRESENT ILLNESS
[FreeTextEntry1] : March 14 th s/p AVR. postoperative PAF, now in sinus rhythm.\par Now walking 3-4 miles over the day. No trouble breathing \par ulnar impingement noted.\par \par \par Prior:\par Continues to exercise by playing tennis and walking uphill.\par No palpitations or dyspnea.\par No syncope.\par \par Prior:\par Doubles tennis 1.5 hours weekly.\par No chest pain or dyspnea.\par Labs with Dr. Rosa. all good.\par Walking without difficulty.\par \par Prior:\par He is feeling well.\par Still walking the dog without difficulty.\par No exertional symptoms.\par He is able to play doubles tennis without difficulty.\par He denies any lightheadedness, dizziness or syncope.\par \par Prior: \par His exercise capacity appears unchanged on his most recent exercise test in 2018\par He denies any palpitations lightheadedness dizziness or syncope.\par No exertional dyspnea.\par No changes to his medications have been made.\par

## 2023-04-05 NOTE — PHYSICAL EXAM
[General Appearance - Well Developed] : well developed [General Appearance - Well Nourished] : well nourished [General Appearance - In No Acute Distress] : no acute distress [Normal Conjunctiva] : the conjunctiva exhibited no abnormalities [Normal Oropharynx] : normal oropharynx [Normal Jugular Venous V Waves Present] : normal jugular venous V waves present [Respiration, Rhythm And Depth] : normal respiratory rhythm and effort [Heart Sounds] : normal S1 and S2 [Arterial Pulses Normal] : the arterial pulses were normal [Edema] : no peripheral edema present [Regular] : the rhythm was regular [Bowel Sounds] : normal bowel sounds [Abdomen Soft] : soft [Abnormal Walk] : normal gait [Cyanosis, Localized] : no localized cyanosis [Skin Color & Pigmentation] : normal skin color and pigmentation [Skin Turgor] : normal skin turgor [Oriented To Time, Place, And Person] : oriented to person, place, and time [Impaired Insight] : insight and judgment were intact [Affect] : the affect was normal [FreeTextEntry1] : a 2/4 blowing holodiastolic murmur is heard at the right Lower sternal border and aex.

## 2023-04-10 PROBLEM — Z09 POSTOPERATIVE FOLLOW-UP: Status: ACTIVE | Noted: 2023-03-24

## 2023-04-12 ENCOUNTER — OUTPATIENT (OUTPATIENT)
Dept: OUTPATIENT SERVICES | Facility: HOSPITAL | Age: 53
LOS: 1 days | End: 2023-04-12
Payer: COMMERCIAL

## 2023-04-12 ENCOUNTER — APPOINTMENT (OUTPATIENT)
Dept: CARDIOTHORACIC SURGERY | Facility: CLINIC | Age: 53
End: 2023-04-12
Payer: COMMERCIAL

## 2023-04-12 VITALS
DIASTOLIC BLOOD PRESSURE: 76 MMHG | OXYGEN SATURATION: 98 % | SYSTOLIC BLOOD PRESSURE: 126 MMHG | BODY MASS INDEX: 27.88 KG/M2 | HEART RATE: 75 BPM | RESPIRATION RATE: 16 BRPM | WEIGHT: 241 LBS | HEIGHT: 78 IN | TEMPERATURE: 98 F

## 2023-04-12 DIAGNOSIS — Z98.890 OTHER SPECIFIED POSTPROCEDURAL STATES: Chronic | ICD-10-CM

## 2023-04-12 DIAGNOSIS — Z95.2 PRESENCE OF PROSTHETIC HEART VALVE: ICD-10-CM

## 2023-04-12 DIAGNOSIS — Z09 ENCOUNTER FOR FOLLOW-UP EXAMINATION AFTER COMPLETED TREATMENT FOR CONDITIONS OTHER THAN MALIGNANT NEOPLASM: ICD-10-CM

## 2023-04-12 PROBLEM — U07.1 COVID-19: Chronic | Status: ACTIVE | Noted: 2023-03-09

## 2023-04-12 PROBLEM — R91.1 SOLITARY PULMONARY NODULE: Chronic | Status: ACTIVE | Noted: 2023-03-09

## 2023-04-12 PROCEDURE — 71046 X-RAY EXAM CHEST 2 VIEWS: CPT

## 2023-04-12 PROCEDURE — 99024 POSTOP FOLLOW-UP VISIT: CPT

## 2023-04-12 PROCEDURE — 71046 X-RAY EXAM CHEST 2 VIEWS: CPT | Mod: 26

## 2023-04-14 RX ORDER — PANTOPRAZOLE 40 MG/1
40 TABLET, DELAYED RELEASE ORAL
Qty: 90 | Refills: 1 | Status: ACTIVE | COMMUNITY
Start: 2023-03-23 | End: 1900-01-01

## 2023-04-14 RX ORDER — ASPIRIN ENTERIC COATED TABLETS 81 MG 81 MG/1
81 TABLET, DELAYED RELEASE ORAL
Qty: 90 | Refills: 2 | Status: ACTIVE | COMMUNITY
Start: 2023-03-23 | End: 1900-01-01

## 2023-04-16 NOTE — ASSESSMENT
[FreeTextEntry1] : Mr. CHO is a 52 year old male with  past medical history of bicuspid AV, severe AI. 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 Post op course significant with AFib, amio 400 q8 . He is here for post op visit.  \par \par Today he presents and reports that he is doing well. Denies any chest pain, shortness of breath, chest pain, palpitations, dizziness or pedal edema. \par \par Today on exam patient's lungs clear bilaterally, normal sinus rhythm, sternum stable, incision clean, dry and intact.   No peripheral edema noted. \par \par  Instructed patient on importance of optimal glycemic control, daily showering, daily weights, any signs of fever (temperature greater than 101F, chills,  incentive spirometer use, and increase ambulation as tolerated. Instructed to call office with any signs or symptoms of infection or weight gain of 2 or more pounds in 1 day or 3 or more pounds in 1 week.  \par \par \par \par Plan:\par 1) Continue current medication regimen\par 2) Follow up with cardiologist ( Dr.Jay Lisker ) and PCP \par 3) Ambulate as tolerated  \par 4) Follow up  with   [facility] in 1 year \par 5) SBE antibiotic prophylaxis discussed at length \par 6) Continue to increase activity and walk daily as tolerated. Continue to use incentive spirometer. \par 7) Keep legs elevated above heart when resting/sitting/sleeping. \par 8) Call MD if you experience fever, fatigue, dizziness, confusion, syncope, shortness of breath, chest pain not relieved with analgesics, increased redness/drainage from the surgical  incision site\par 9) Virtual Cardiac Rehab referral \par

## 2023-04-16 NOTE — CONSULT LETTER
[Dear  ___] : Dear  [unfilled], [Courtesy Letter:] : I had the pleasure of seeing your patient, [unfilled], in my office today. [Please see my note below.] : Please see my note below. [Consult Closing:] : Thank you very much for allowing me to participate in the care of this patient.  If you have any questions, please do not hesitate to contact me. [Sincerely,] : Sincerely, [FreeTextEntry2] : Dr.Jay Lisker, [FreeTextEntry3] : True Silva M.D.\par Professor of Cardiovascular and Thoracic Surgery\par Minimally Invasive Valve Surgeon\par Director of Aortic Surgery, [facility]\par Cell: [phone]\par Email: [email or web address]\par

## 2023-04-16 NOTE — END OF VISIT
[FreeTextEntry3] : \par I personally scribed for EDMUNDO CAO on Apr 12 2023  9:30AM . \par \par I personally performed the services described in the documentation, reviewed the documentation recorded by the scribe in my presence and it accurately and completely records my words and actions.\par \par \par \par \par \par Physician Attestation:\par Documented by ZOILA CHANG acting as a scribe for EDMUNDO CAO 04/12/2023 . \par                 All medical record entries made by the Scribe were at my, EDMUNDO CAO , direction and personally dictated by me on 04/12/2023 . I have reviewed the chart and agree that the record accurately reflects my personal performance of the history, physical exam, assessment and plan\par \par

## 2023-04-16 NOTE — REASON FOR VISIT
[Family Member] : family member [de-identified] : Aortic valve replacement utilizing a size 27 mm [employer] bilateral valve, Aortic root enlargement down to noncoronary sinus  [de-identified] : 3/14/23

## 2023-04-16 NOTE — PROCEDURE
[FreeTextEntry1] :  Dr. Silva reviewed the indications for surgery, and used our webpage [email or web address].org <[email or web address].heartprocedures.org> to illustrate the aorta and anatomy of the heart. Those indications are the following: size greater than 5.0 cm, symptomatic aneurysms, family history of aortic dissection or aneurysm death with a size greater than 4.5 cm, other necessary cardiac procedures such as coronary artery bypass grafting or valvular disorders with an aneurysm greater than 4.5 cm, or connective tissue disorders with an aneurysm size greater than 4.5 cm. The patient does not meet size criteria for surgical intervention at the time. Patient was advised to view the educational video prior to this visit regarding aortic pathology, risk factors, surgical procedures, and lifestyle modifications. Video can be retrieved at <[email or web address]/watch?[email or web address]=[email or web address].\par \par Dr. Silva discussed activity restrictions with the patient, and would advise exercise at a moderate amount with no heavy lifting over one third of body weight, and avoiding heart rates that exceed 140 beats per minute. In addition, every patient should abstain from tobacco abuse and to avoid all illicit drug use, especially stimulants such as cocaine or methamphetamine. Dr. Silva also counseled regarding maintaining a healthy heart diet, and losing any excessive weight as this also put undue stress on both the aorta and entire cardiovascular system. First degree family members should be screened for bicuspid valve disease, and ascending aortic aneurysms. \par \par \par

## 2023-05-11 ENCOUNTER — APPOINTMENT (OUTPATIENT)
Dept: CARDIOLOGY | Facility: CLINIC | Age: 53
End: 2023-05-11
Payer: COMMERCIAL

## 2023-05-11 PROCEDURE — 93306 TTE W/DOPPLER COMPLETE: CPT

## 2024-01-15 ENCOUNTER — NON-APPOINTMENT (OUTPATIENT)
Age: 54
End: 2024-01-15

## 2024-01-15 ENCOUNTER — APPOINTMENT (OUTPATIENT)
Dept: CARDIOLOGY | Facility: CLINIC | Age: 54
End: 2024-01-15
Payer: COMMERCIAL

## 2024-01-15 VITALS
BODY MASS INDEX: 27.77 KG/M2 | WEIGHT: 240 LBS | HEART RATE: 72 BPM | OXYGEN SATURATION: 97 % | HEIGHT: 78 IN | DIASTOLIC BLOOD PRESSURE: 90 MMHG | SYSTOLIC BLOOD PRESSURE: 120 MMHG

## 2024-01-15 DIAGNOSIS — I48.91 OTHER POSTPROCEDURAL COMPLICATIONS AND DISORDERS OF THE CIRCULATORY SYSTEM, NOT ELSEWHERE CLASSIFIED: ICD-10-CM

## 2024-01-15 DIAGNOSIS — Z95.2 PRESENCE OF PROSTHETIC HEART VALVE: ICD-10-CM

## 2024-01-15 DIAGNOSIS — I97.89 OTHER POSTPROCEDURAL COMPLICATIONS AND DISORDERS OF THE CIRCULATORY SYSTEM, NOT ELSEWHERE CLASSIFIED: ICD-10-CM

## 2024-01-15 PROCEDURE — 93000 ELECTROCARDIOGRAM COMPLETE: CPT

## 2024-01-15 PROCEDURE — 99214 OFFICE O/P EST MOD 30 MIN: CPT | Mod: 25

## 2024-01-15 RX ORDER — METOPROLOL SUCCINATE 200 MG/1
200 TABLET, EXTENDED RELEASE ORAL
Qty: 90 | Refills: 3 | Status: ACTIVE | COMMUNITY
Start: 2023-03-23 | End: 1900-01-01

## 2024-01-15 RX ORDER — AMIODARONE HYDROCHLORIDE 200 MG/1
200 TABLET ORAL DAILY
Qty: 90 | Refills: 2 | Status: DISCONTINUED | COMMUNITY
Start: 2023-03-23 | End: 2024-01-15

## 2024-01-15 RX ORDER — AMLODIPINE BESYLATE 10 MG/1
10 TABLET ORAL
Qty: 90 | Refills: 3 | Status: ACTIVE | COMMUNITY
Start: 2023-03-23 | End: 1900-01-01

## 2024-01-15 NOTE — HISTORY OF PRESENT ILLNESS
[FreeTextEntry1] : Has been feeling okay. He continues to hike without much difficulty. No chest pain or dyspena. No palpitations. No bleeding.  Prior: March 14 th s/p AVR. postoperative PAF, now in sinus rhythm. Now walking 3-4 miles over the day. No trouble breathing  ulnar impingement noted.  Prior: Continues to exercise by playing tennis and walking uphill. No palpitations or dyspnea. No syncope.  Prior: Doubles tennis 1.5 hours weekly. No chest pain or dyspnea. Labs with Dr. Rosa. all good. Walking without difficulty.  Prior: He is feeling well. Still walking the dog without difficulty. No exertional symptoms. He is able to play doubles tennis without difficulty. He denies any lightheadedness, dizziness or syncope.  Prior:  His exercise capacity appears unchanged on his most recent exercise test in 2018 He denies any palpitations lightheadedness dizziness or syncope. No exertional dyspnea. No changes to his medications have been made.

## 2024-01-15 NOTE — DISCUSSION/SUMMARY
[EKG obtained to assist in diagnosis and management of assessed problem(s)] : EKG obtained to assist in diagnosis and management of assessed problem(s) [FreeTextEntry1] :  Mr. Witt is a 53-year-old with bicuspid aortic valve now s/p AVR. postoperatively complicated by prolonged chest tube drainage and PAF. Now in sinus rhythm. ECG now has Lateral ST segment depressions improved.   PAF: peroperative. no a/c. Off amiodarone for 3 months now. AVR: Echo in May annually. Stressed requirement for abx prophy. Amox 1g 1 hour prior to procedure. See me in 6 months.

## 2024-03-31 ENCOUNTER — NON-APPOINTMENT (OUTPATIENT)
Age: 54
End: 2024-03-31

## 2024-04-04 ENCOUNTER — APPOINTMENT (OUTPATIENT)
Dept: CT IMAGING | Facility: IMAGING CENTER | Age: 54
End: 2024-04-04

## 2024-04-10 ENCOUNTER — APPOINTMENT (OUTPATIENT)
Dept: CARDIOTHORACIC SURGERY | Facility: CLINIC | Age: 54
End: 2024-04-10

## 2024-04-19 NOTE — PROGRESS NOTE ADULT - PROVIDER SPECIALTY LIST ADULT
Anesthesia
Critical Care
Anesthesia
Anesthesia
Critical Care
Critical Care
CT Surgery
Electrophysiology
CT Surgery
Discharged

## 2024-07-15 ENCOUNTER — APPOINTMENT (OUTPATIENT)
Dept: CARDIOLOGY | Facility: CLINIC | Age: 54
End: 2024-07-15
Payer: COMMERCIAL

## 2024-07-15 ENCOUNTER — NON-APPOINTMENT (OUTPATIENT)
Age: 54
End: 2024-07-15

## 2024-07-15 VITALS
SYSTOLIC BLOOD PRESSURE: 110 MMHG | WEIGHT: 258 LBS | HEIGHT: 78 IN | HEART RATE: 60 BPM | OXYGEN SATURATION: 98 % | BODY MASS INDEX: 29.85 KG/M2 | DIASTOLIC BLOOD PRESSURE: 80 MMHG

## 2024-07-15 DIAGNOSIS — Z95.2 PRESENCE OF PROSTHETIC HEART VALVE: ICD-10-CM

## 2024-07-15 PROCEDURE — 99214 OFFICE O/P EST MOD 30 MIN: CPT | Mod: 25

## 2024-07-15 PROCEDURE — 93306 TTE W/DOPPLER COMPLETE: CPT

## 2024-07-15 PROCEDURE — 93000 ELECTROCARDIOGRAM COMPLETE: CPT

## 2024-08-19 ENCOUNTER — APPOINTMENT (OUTPATIENT)
Dept: CARDIOLOGY | Facility: CLINIC | Age: 54
End: 2024-08-19
Payer: COMMERCIAL

## 2024-08-19 VITALS
WEIGHT: 257 LBS | DIASTOLIC BLOOD PRESSURE: 80 MMHG | OXYGEN SATURATION: 97 % | BODY MASS INDEX: 29.7 KG/M2 | SYSTOLIC BLOOD PRESSURE: 123 MMHG | HEART RATE: 63 BPM

## 2024-08-19 DIAGNOSIS — Z95.2 PRESENCE OF PROSTHETIC HEART VALVE: ICD-10-CM

## 2024-08-19 DIAGNOSIS — I50.9 HEART FAILURE, UNSPECIFIED: ICD-10-CM

## 2024-08-19 PROCEDURE — 99213 OFFICE O/P EST LOW 20 MIN: CPT

## 2024-08-19 NOTE — DISCUSSION/SUMMARY
[FreeTextEntry1] : Mr. Witt is a 54-year-old with bicuspid aortic valve now s/p AVR. postoperatively complicated by prolonged chest tube drainage and PAF. Now in sinus rhythm. ECG now has Lateral ST segment depressions unchanged.   PAF: perioperative. no a/c. metoprolol reduced to 100 qd and he is feeling fine.  AVR: Bio- looks well on echo.  Reviewed requirement for abx prophy. Amox 1g 1 hour prior to procedure. Encouraged routine aerobic activity. See me in 6 months.

## 2024-08-19 NOTE — HISTORY OF PRESENT ILLNESS
[FreeTextEntry1] : Feeling good. Just back from [place] last night.  Walking for exercise, also doubles tennis once a week.  No chest discomfort, shortness of breath, palpitations, lightheadedness or syncope.  Prior: Long walks, feels fine. No chest pain or palpitations. No bleeding or bruising. Taking abx prophy prior to dental procedure.  Prior: Has been feeling okay. He continues to hike without much difficulty. No chest pain or dyspena. No palpitations. No bleeding.  Prior: March 14 th s/p AVR. postoperative PAF, now in sinus rhythm. Now walking 3-4 miles over the day. No trouble breathing  ulnar impingement noted.  Prior: Continues to exercise by playing tennis and walking uphill. No palpitations or dyspnea. No syncope.  Prior: Doubles tennis 1.5 hours weekly. No chest pain or dyspnea. Labs with Dr. Rosa. all good. Walking without difficulty.  Prior: He is feeling well. Still walking the dog without difficulty. No exertional symptoms. He is able to play doubles tennis without difficulty. He denies any lightheadedness, dizziness or syncope.  Prior:  His exercise capacity appears unchanged on his most recent exercise test in 2018 He denies any palpitations lightheadedness dizziness or syncope. No exertional dyspnea. No changes to his medications have been made.

## 2024-12-13 NOTE — ASU PATIENT PROFILE, ADULT - BILL OF RIGHTS/ADMISSION INFORMATION PROVIDED TO:
Patient [No Respiratory Distress] : no respiratory distress  [No Accessory Muscle Use] : no accessory muscle use [Normal Rate] : normal rate  [Regular Rhythm] : with a regular rhythm [Pedal Pulses Present] : the pedal pulses are present [No Edema] : there was no peripheral edema [No Extremity Clubbing/Cyanosis] : no extremity clubbing/cyanosis [Soft] : abdomen soft [Non Tender] : non-tender [Normal Bowel Sounds] : normal bowel sounds [de-identified] : frail, thin  [de-identified] : pocketing food [de-identified] : scattered inspiratory wheezes. and occasional cough [de-identified] : alert and oriented x 1

## 2025-02-18 ENCOUNTER — APPOINTMENT (OUTPATIENT)
Dept: CARDIOLOGY | Facility: CLINIC | Age: 55
End: 2025-02-18
Payer: COMMERCIAL

## 2025-02-18 ENCOUNTER — NON-APPOINTMENT (OUTPATIENT)
Age: 55
End: 2025-02-18

## 2025-02-18 VITALS
HEART RATE: 60 BPM | DIASTOLIC BLOOD PRESSURE: 86 MMHG | WEIGHT: 267 LBS | SYSTOLIC BLOOD PRESSURE: 124 MMHG | BODY MASS INDEX: 30.89 KG/M2 | HEIGHT: 78 IN

## 2025-02-18 DIAGNOSIS — I35.1 NONRHEUMATIC AORTIC (VALVE) INSUFFICIENCY: ICD-10-CM

## 2025-02-18 DIAGNOSIS — Z87.74 PERSONAL HISTORY OF (CORRECTED) CONGENITAL MALFORMATIONS OF HEART AND CIRCULATORY SYSTEM: ICD-10-CM

## 2025-02-18 DIAGNOSIS — I10 ESSENTIAL (PRIMARY) HYPERTENSION: ICD-10-CM

## 2025-02-18 DIAGNOSIS — Z95.2 PRESENCE OF PROSTHETIC HEART VALVE: ICD-10-CM

## 2025-02-18 DIAGNOSIS — E66.9 OBESITY, UNSPECIFIED: ICD-10-CM

## 2025-02-18 PROCEDURE — 93000 ELECTROCARDIOGRAM COMPLETE: CPT

## 2025-02-18 PROCEDURE — 99214 OFFICE O/P EST MOD 30 MIN: CPT

## 2025-02-18 PROCEDURE — G2211 COMPLEX E/M VISIT ADD ON: CPT | Mod: NC

## 2025-08-18 ENCOUNTER — NON-APPOINTMENT (OUTPATIENT)
Age: 55
End: 2025-08-18

## 2025-08-19 ENCOUNTER — APPOINTMENT (OUTPATIENT)
Dept: CARDIOLOGY | Facility: CLINIC | Age: 55
End: 2025-08-19
Payer: COMMERCIAL

## 2025-08-19 VITALS
SYSTOLIC BLOOD PRESSURE: 120 MMHG | OXYGEN SATURATION: 96 % | WEIGHT: 249 LBS | HEART RATE: 58 BPM | DIASTOLIC BLOOD PRESSURE: 78 MMHG | BODY MASS INDEX: 28.77 KG/M2

## 2025-08-19 DIAGNOSIS — E66.9 OBESITY, UNSPECIFIED: ICD-10-CM

## 2025-08-19 DIAGNOSIS — Z95.2 PRESENCE OF PROSTHETIC HEART VALVE: ICD-10-CM

## 2025-08-19 DIAGNOSIS — I10 ESSENTIAL (PRIMARY) HYPERTENSION: ICD-10-CM

## 2025-08-19 PROCEDURE — 99214 OFFICE O/P EST MOD 30 MIN: CPT

## 2025-08-19 PROCEDURE — G2211 COMPLEX E/M VISIT ADD ON: CPT | Mod: NC

## 2025-08-19 PROCEDURE — 93000 ELECTROCARDIOGRAM COMPLETE: CPT

## 2025-08-28 ENCOUNTER — APPOINTMENT (OUTPATIENT)
Dept: CARDIOLOGY | Facility: CLINIC | Age: 55
End: 2025-08-28
Payer: COMMERCIAL

## 2025-08-28 PROCEDURE — 93306 TTE W/DOPPLER COMPLETE: CPT

## 2025-09-04 ENCOUNTER — TRANSCRIPTION ENCOUNTER (OUTPATIENT)
Age: 55
End: 2025-09-04

## (undated) DEVICE — SPECIMEN CONTAINER 100ML

## (undated) DEVICE — BLADE SCALPEL SAFETYLOCK #10

## (undated) DEVICE — TUBING TRUWAVE PRESSURE MALE/FEMALE 12"

## (undated) DEVICE — STEALTH CLAMP INSERT FIBRA/FIBRA 90MM

## (undated) DEVICE — STEALTH CLAMP INSERT FIBRA/FIBRA 60MM

## (undated) DEVICE — SUT SOFSILK 0 30" V-20

## (undated) DEVICE — CATH IV SAFE INSYTE 14G X 1.75" (ORANGE)

## (undated) DEVICE — Device

## (undated) DEVICE — DRAPE LIGHT HANDLE COVER (BLUE)

## (undated) DEVICE — SUMP PERICARDIAL 20FR 1/4" ADULT

## (undated) DEVICE — SUT ETHIBOND EXCEL 2-0 30" SH-2 5 GREEN 5 WHITE

## (undated) DEVICE — ELCTR HEX BLADE

## (undated) DEVICE — ELCTR BOVIE TIP BLADE MEGADYNE E-Z CLEAN 6.5" (LONG)

## (undated) DEVICE — SYR LUER LOK 1CC

## (undated) DEVICE — CHEST DRAIN PLEUR-EVAC WET/WET ADULT-PEDS SINGLE (QUICK)

## (undated) DEVICE — DRAIN JACKSON PRATT 19FR ROUND END W TROCAR

## (undated) DEVICE — SUT PLEDGET PRE PUNCH 4.8 X 9.5 X 1.5 MM

## (undated) DEVICE — VESSEL LOOP ASPEN SUPERMAXI BLUE

## (undated) DEVICE — SUT SILK 2-0 18" SH (POP-OFF)

## (undated) DEVICE — SWITCH ARISS TABLE MOUNT UNEQUAL LEGS 13"

## (undated) DEVICE — TUBING INSUFFLATION LAP FILTER 10FT

## (undated) DEVICE — CONNECTOR CARDIAC 1:1 FOR HUBLESS DRAINS

## (undated) DEVICE — BLADE SCALPEL SAFETYLOCK #11

## (undated) DEVICE — GOWN LG

## (undated) DEVICE — DRAPE 3/4 SHEET W REINFORCEMENT 56X77"

## (undated) DEVICE — SAW BLADE MICROAIRE STERNUM 1X34X9.4MM

## (undated) DEVICE — GLV 7.5 PROTEXIS (WHITE)

## (undated) DEVICE — FILTER REINFUSION FOR SALVAGED BLOOD DISP

## (undated) DEVICE — BLADE SCALPEL SAFETYLOCK #15

## (undated) DEVICE — SYR LUER LOK 50CC

## (undated) DEVICE — DRAPE MAYO STAND 30"

## (undated) DEVICE — CONNECTOR STRAIGHT 3/8 X 3/8"

## (undated) DEVICE — SUMP INTRACARDIAC 20FR 1/4" ADULT

## (undated) DEVICE — WARMING BLANKET DUO-THERM HYPER/HYPOTHERM ADULT

## (undated) DEVICE — SOL NORMOSOL-R PH7.4 1000ML

## (undated) DEVICE — SUT TICRON 2-0 36" CV-316 DA

## (undated) DEVICE — STOPCOCK 4-WAY 2 GANG W SWIVEL MALE LUER LOCK

## (undated) DEVICE — DEFIBRILLATOR PAD PRE-CONNECT ADULT/CHILD

## (undated) DEVICE — DRAPE 1/2 SHEET 40X57"

## (undated) DEVICE — GOWN TRIMAX XXL

## (undated) DEVICE — SUT POLYSORB 0 36" GS-25 UNDYED

## (undated) DEVICE — TUBING ATS SUCTION LINE

## (undated) DEVICE — SUT BIOSYN 4-0 18" P-12

## (undated) DEVICE — TUBING KIT FAST START ATF 40

## (undated) DEVICE — SUT PDS II 0 27" OS-6

## (undated) DEVICE — ELCTR BOVIE TIP CLEANER SCRATCH PAD

## (undated) DEVICE — SUT POLYSORB 2-0 30" GS-21 UNDYED

## (undated) DEVICE — SUT SURGICAL STEEL 6 30" BP-1

## (undated) DEVICE — RIGID ADULT SUCKER

## (undated) DEVICE — FOLEY TRAY 16FR 5CC LTX UMETER CLOSED

## (undated) DEVICE — PACK UNIVERSAL CARDIAC

## (undated) DEVICE — SUT PROLENE 4-0 36" RB-1

## (undated) DEVICE — TUBING SUCTION 20FT

## (undated) DEVICE — MULTIPLE PERFUSION SET FEMALE 1 INLET LEG W 4 LEGS 15" (BLUE/RED)

## (undated) DEVICE — CONNECTOR STRAIGHT 3/8 X 1/2"

## (undated) DEVICE — ELCTR BOVIE TIP BLADE MEGADYNE E-Z CLEAN 2.5" (SHORT)

## (undated) DEVICE — PACING CABLE (BROWN) A/V TEMP SCREW DOWN 12FT

## (undated) DEVICE — PHRENIC NERVE PAD MEDIUM

## (undated) DEVICE — DRAPE TOWEL BLUE 17" X 24"

## (undated) DEVICE — DRSG TEGADERM 6"X8"

## (undated) DEVICE — VASCULAR DILATOR KIT 8,12,16,20, 24FR

## (undated) DEVICE — SYR LUER LOK 30CC

## (undated) DEVICE — SUT BOOT STANDARD (ASSORTED) 5 PAIR

## (undated) DEVICE — GLV 8 PROTEXIS (WHITE)

## (undated) DEVICE — SENSOR MYOCARDIAL TEMP 15MM

## (undated) DEVICE — TUBING IV SET MICROCLAVE ADAPTER

## (undated) DEVICE — STAPLER SKIN VISI-STAT 35 WIDE

## (undated) DEVICE — PACING CABLE A/V TEMP SCREW DOWN 6FT

## (undated) DEVICE — SUT PLEDGET 9MM X 4MM X 1.5MM

## (undated) DEVICE — NDL TAPR FR EYE 1/2 CIR 3

## (undated) DEVICE — ADAPTER DLP MALE 1/4" X 2" (CLEAR) BARBED

## (undated) DEVICE — DRAPE INSTRUMENT POUCH 6.75" X 11"

## (undated) DEVICE — FOLEY HOLDER STATLOCK 2 WAY ADULT

## (undated) DEVICE — SUCTION YANKAUER NO CONTROL VENT

## (undated) DEVICE — LAP PAD 18 X 18"

## (undated) DEVICE — SOL IRR POUR NS 0.9% 500ML

## (undated) DEVICE — DRAIN JACKSON PRATT 10MM FLAT FULL NO TROCAR

## (undated) DEVICE — SUT TICRON 4-0 36" CV-331 DA

## (undated) DEVICE — SUT PROLENE 4-0 36" SH

## (undated) DEVICE — DRSG OPSITE 13.75 X 4"

## (undated) DEVICE — NDL COUNTER FOAM AND MAGNET 40-70

## (undated) DEVICE — SUT ETHIBOND 4-0 30" RB-1

## (undated) DEVICE — SUT SILK 0 30" TIES

## (undated) DEVICE — SUT PDS II 2-0 27" CT-1 UNDYED

## (undated) DEVICE — INSERT TRACTION 66MM ULTRA STEP

## (undated) DEVICE — PREP CHLORAPREP HI-LITE ORANGE 26ML

## (undated) DEVICE — SOL IRR BAG NS 0.9% 3000ML

## (undated) DEVICE — SPONGE PEANUT AUTO COUNT

## (undated) DEVICE — SUT BLUNT SZ 5

## (undated) DEVICE — VENTING ADAPTER "Y" (RED/BLUE) 7.5"

## (undated) DEVICE — MEDICATION LABELS W MARKER

## (undated) DEVICE — PACK CUSTOM W/INSPIRE OXYGENATOR

## (undated) DEVICE — SYR ASEPTO

## (undated) DEVICE — TUBING SUCTION NON CONDUCTIVE 316X18" STERILE

## (undated) DEVICE — VISITEC 4X4

## (undated) DEVICE — CONNECTOR STRAIGHT 1/4 X 3/8"

## (undated) DEVICE — GLV 7 PROTEXIS (WHITE)

## (undated) DEVICE — ELCTR CAUTERY 2" LOOP TIP 2200 DEG FAHRENHEIT

## (undated) DEVICE — SUCTION TUBE CARDIAC SOFT TIP 6FR SHAFT 10FR TIP 6"

## (undated) DEVICE — URETERAL CATH RED RUBBER 8FR

## (undated) DEVICE — NDL HYPO SAFE 18G X 1.5" (PINK)

## (undated) DEVICE — SUT PROLENE 3-0 36" SH

## (undated) DEVICE — CONNECTOR "Y" 3/8 X 1/4 X 3/8"

## (undated) DEVICE — SET PERF CARDIOPLEGIA 4 ARM STRL

## (undated) DEVICE — DRSG DERMABOND PRINEO 60CM

## (undated) DEVICE — PACK UNIVERSAL CARDIAC SUPPLEMNTAL B

## (undated) DEVICE — SUT PDS II 3-0 36" SH